# Patient Record
Sex: FEMALE | Race: WHITE | NOT HISPANIC OR LATINO | URBAN - METROPOLITAN AREA
[De-identification: names, ages, dates, MRNs, and addresses within clinical notes are randomized per-mention and may not be internally consistent; named-entity substitution may affect disease eponyms.]

---
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
## 2021-05-11 NOTE — H&P
History and Physical      Patient Name: Jayla Lu   Patient ID: [id number]   Sex: Female   YOB: 2000    Primary Care Provider: Rosalinda KAY   Referring Provider: Rosalinda KAY    Visit Date: March 19, 2019    Provider: ELIZA Porter   Location: [facility]   Location Address: [address] [place] [address], [address]  [place], [place]  [id number]   Location Phone: [phone]          Chief Complaint  · New Patient/Establish Care      History Of Present Illness  Jayla Lu is a 19 year old /White female who presents for evaluation and treatment of:      New Patient/Establish Care  Previous PCP was Dr. Javier Pediatrician with [facility].    Pt c/o having possible hemorrhoid.  She reports it has been there for about 1 year. Notices blood in stool a times, and feeling like it is poking out after a BM, it is not today.  She reports it does bleed at times it only when she wipes.  Last episode of bleeding was about 4 months ago, has only happed a few times.  Has regular bowel movments.    Has been a [employer] for 4 yrs now.  She does eat alot of eggs, and veggies and protien bars and almond milk.    flu shot- 10/2018  Tdap- UTD  Depression screening completed with a score of 5.    She is a senior at [employer] and she plans on going to  to major and Dance and Art.       Past Surgical History  Procedure Name Date Notes   Tonsillectomy and adenoidectomy in patient less than 12 years of age 2012 --    [place] Tooth Extraction 2017 --          Allergy List  Allergen Name Date Reaction Notes   cephalexin --  --  --          Family Medical History  Disease Name Relative/Age Notes   Aneurysm / --     Grandmother (maternal)/    Bladder Cancer / --     Grandfather (paternal)/    Diabetes mellitus, type II / --     Grandmother (paternal)/    Heart Attack (MI) / --     Grandfather (maternal)/    Kidney Cancer / --     Grandfather (paternal)/          Reproductive History  Menstrual   Age  "Menarche: 13   Pregnancy Summary   Total Pregnancies: 0 Full Term: 0 Premature: 0   Ab Induced: 0 Ab Spontaneous: 0 Ectopics: 0   Multiples: 0 Livin         Social History  Finding Status Start/Stop Quantity Notes   Alcohol Never --/-- --  --    Exercises regularly --  --/-- --  2-3 x per week   Single --  --/-- --  --    Tobacco Never --/-- --  --          Review of Systems  · Constitutional  o Denies  o : fever, fatigue, weight loss, weight gain  · Cardiovascular  o Denies  o : lower extremity edema, claudication, chest pressure, palpitations  · Respiratory  o Denies  o : shortness of breath, wheezing, frequent cough, hemoptysis, dyspnea on exertion  · Gastrointestinal  o Admits  o : hemmrroids  o Denies  o : nausea, vomiting, diarrhea, constipation, abdominal pain      Vitals  Date Time BP Position Site L\R Cuff Size HR RR TEMP(F) WT  HT  BMI kg/m2 BSA m2 O2 Sat        2019 03:00 /66 Sitting    78 - R   110lbs 8oz 5'  3.5\" 19.27 1.5 98 %          Physical Examination  · Constitutional  o Appearance  o : well-nourished, well developed, alert, in no acute distress  · Respiratory  o Respiratory Effort  o : breathing unlabored  o Auscultation of Lungs  o : normal breath sounds throughout  · Cardiovascular  o Heart  o :   § Auscultation of Heart  § : regular rate and rhythm, no murmurs, gallops or rubs  § Palpation of Heart  § : normal apical impulse, no cardiac thrill present  o Peripheral Vascular System  o :   § Extremities  § : no cyanosis, clubbing or edema; less than 2 second refill noted  · Gastrointestinal  o Abdominal Examination  o : abdomen nontender to palpation, tone normal without rigidity or guarding, no masses present, abdominal contour scaphoid  o Liver and spleen  o : no hepatomegaly present, liver nontender to palpation, spleen not palpable  · Genitourinary  o Anus  o : no inflammation or lesions present  o Perineum  o : perineum within normal limits  · Skin and Subcutaneous " Tissue  o General Inspection  o : no rashes or lesions present, no areas of discoloration  · Neurologic  o Mental Status Examination  o :   § Orientation  § : grossly oriented to person, place and time  o Cranial Nerves  o : cranial nerves intact and symmetric throughout  · Psychiatric  o Mood and Affect  o : mood normal, affect appropriate, denies any SI/HI              Assessment  · Screening for depression     V79.0/Z13.89  · Screening for lipid disorders     V77.91/Z13.220  · Establishing care with new doctor, encounter for     V65.8/Z76.89  · Hemorrhoid     455.6/K64.9  · Routine lab draw     V72.60/Z00.00  · Screening for thyroid disorder     V77.0/Z13.29      Plan  · Orders  o CBC with Auto Diff [facility] (19968) - - 03/19/2019  o CMP [facility] (79975) - - 03/19/2019  o Lipid Panel [facility] (93625) - - 03/19/2019  o Thyroid Profile (31267, 13029, THYII) - - 03/19/2019  o ACO-39: Current medications updated and reviewed () - - 03/19/2019  o ACO-18: Positive screen for clinical depression using a standardized tool and a follow-up plan documented () - - 03/19/2019  o ACO-14: Influenza immunization administered or previously received () - - 03/19/2019  o ACO-16: BMI below normal range and follow-up plan is documented () - - 03/19/2019  o ACO-17: Current tobacco non-user (1036F) - - 03/19/2019  · Medications  o Anusol-HC 25 mg rectal suppository   SIG: insert 1 suppository (25 mg) by rectal route 2 times per day for 2 weeks   DISP: (28) suppositories with 0 refills  Prescribed on 03/19/2019     · Instructions  o Depression Screen completed and scanned into the EMR under the designated folder within the patient's documents.  o PHQ-9 results between 5-9 indicate Minimal Depression  o Take all medications as prescribed/directed.  o Patient was educated/instructed on their diagnosis, treatment and medications prior to discharge from the clinic today.  o Patient was instructed to exercise regularly.  o Patient  instructed to seek medical attention urgently for new or worsening symptoms.  o Call the office with any concerns or questions.  o advised f/u if symtoms do not resolve and will consult GI  · Disposition  o Call or Return if symptoms worsen or persist.  o Return Visit Request in/on 1 year +/- 2 weeks ([id number]).            Electronically Signed by: ELIZA Porter -Author on March 19, 2019 03:43:11 PM

## 2021-05-14 NOTE — PROGRESS NOTES
Progress Note      Patient Name: Jayla Lu   Patient ID: [id number]   Sex: Female   YOB: 2000    Primary Care Provider: Rosalinda KAY   Referring Provider: Rosalinda KAY    Visit Date: August 15, 2019    Provider: ELIZA Porter   Location: [facility]   Location Address: [address] [place] [address], [address]  [place]  [id number]   Location Phone: [phone]          Chief Complaint  · discuss birth control  · immunizations      History Of Present Illness  Jayla Lu is a 19 year old /White female who presents for evaluation and treatment of:      pt is here today to discuss Birth Control and Immunizations.    Pt would like to discuss starting birth control due to mood swings. She states that she will have high high's and low low's. She is starting collage and feels that starting birth control would help this. She is on her cycle now and  is sexually active.    LMP: started 20.    She is also wondering about immunizations for school. She brought a copy of her shot records with her today.            Past Medical History  Disease Name Date Onset Notes   Hemorrhoid --  --          Past Surgical History  Procedure Name Date Notes   Tonsillectomy and adenoidectomy in patient less than 12 years of age  --    [place] Tooth Extraction  --          Allergy List  Allergen Name Date Reaction Notes   cephalexin --  --  --          Family Medical History  Disease Name Relative/Age Notes   Aneurysm Grandmother (maternal)/   --    Heart Attack (MI) Grandfather (maternal)/   --    Diabetes mellitus, type II Grandmother (paternal)/   --    Bladder Cancer Grandfather (paternal)/   --    Kidney Cancer Grandfather (paternal)/   --          Reproductive History  Menstrual   Age Menarche: 13   Pregnancy Summary   Total Pregnancies: 0 Full Term: 0 Premature: 0   Ab Induced: 0 Ab Spontaneous: 0 Ectopics: 0   Multiples: 0 Livin         Social History  Finding Status  "Start/Stop Quantity Notes   Alcohol Never --/-- --  --    Exercises regularly --  --/-- --  2-3 x per week   Single --  --/-- --  --    Student --  --/-- --  --    Tobacco Never --/-- --  --          Immunizations  NameDate Admin Mfg Trade Name Lot Number Route Inj VIS Given VIS Publication   Idbsjxazj20/01/2018 NE Not Entered  NE NE     Comments: Pt reported         Review of Systems  · Constitutional  o Denies  o : fever, fatigue, weight loss, weight gain  · Cardiovascular  o Denies  o : lower extremity edema, claudication, chest pressure, palpitations  · Respiratory  o Denies  o : shortness of breath, wheezing, cough, hemoptysis, dyspnea on exertion  · Gastrointestinal  o Denies  o : nausea, vomiting, diarrhea, constipation, abdominal pain      Vitals  Date Time BP Position Site L\R Cuff Size HR RR TEMP (F) WT  HT  BMI kg/m2 BSA m2 O2 Sat HC       03/19/2019 03:00 /66 Sitting    78 - R   110lbs 8oz 5'  3.5\" 19.27 1.5 98 %    08/15/2019 11:26 /74 Sitting    81 - R   121lbs 1oz 5'  3\" 21.45 1.56 96 %          Physical Examination  · Constitutional  o Appearance  o : well-nourished, well developed, alert, in no acute distress  · Respiratory  o Respiratory Effort  o : breathing unlabored  o Auscultation of Lungs  o : normal breath sounds throughout  · Cardiovascular  o Heart  o :   § Auscultation of Heart  § : regular rate and rhythm, no murmurs, gallops or rubs  § Palpation of Heart  § : normal apical impulse, no cardiac thrill present  o Peripheral Vascular System  o :   § Extremities  § : no cyanosis, clubbing or edema; less than 2 second refill noted  · Gastrointestinal  o Abdominal Examination  o : abdomen nontender to palpation, tone normal without rigidity or guarding, no masses present, abdominal contour scaphoid  o Liver and spleen  o : no hepatomegaly present, liver nontender to palpation, spleen not palpable  · Skin and Subcutaneous Tissue  o General Inspection  o : no rashes or lesions " present, no areas of discoloration  · Neurologic  o Mental Status Examination  o :   § Orientation  § : grossly oriented to person, place and time  o Cranial Nerves  o : cranial nerves intact and symmetric throughout  · Psychiatric  o Mood and Affect  o : mood normal, affect appropriate          Results  · In-Office Procedures  o Lab procedure  § IOP - Urine Pregnancy Test (55022)   § B-HCG Ur Ql: Negative   § Internal Control Verified?: Yes       Assessment  · Mood swings     296.99/R45.86      Plan  · Orders  o ACO-39: Current medications updated and reviewed () - - 08/15/2019  o ACO-14: Influenza immunization was not administered for reasons documented () - - 08/15/2019   pt has not received the flu vaccine for the 2699-0249 flu season  · Medications  o Loestrin Fe 1.5/30 (28-Day) 1.5 mg-30 mcg (21)/75 mg (7) oral tablet   SIG: take 1 tablet by oral route once daily for 90 days   DISP: (90) tablets with 2 refills  Prescribed on 08/15/2019     · Instructions  o Patient was educated/instructed on their diagnosis, treatment and medications prior to discharge from the clinic today.  o Patient instructed to seek medical attention urgently for new or worsening symptoms.  o Call the office with any concerns or questions.  · Disposition  o Call or Return if symptoms worsen or persist.  o Return Visit Request in/on 1 year +/- 2 weeks ([id number]).            Electronically Signed by: ELIZA Porter -Author on August 15, 2019 12:04:46 PM

## 2021-05-14 NOTE — PROGRESS NOTES
Progress Note      Patient Name: Jayla Lu   Patient ID: [id number]   Sex: Female   YOB: 2000    Primary Care Provider: Rosalinda KAY   Referring Provider: Rosalinda KAY    Visit Date: August 17, 2020    Provider: ELIZA Porter   Location: [facility]   Location Address: [address] [place] [address], [address]  [place], [place]  [id number]   Location Phone: [phone]          Chief Complaint  · F/U Birth Control       History Of Present Illness  Video Conferencing Visit  Jayla Lu is a 20 year old /White female who is presenting for evaluation via video conferencing via [employer]. Verbal consent obtained before beginning visit.   The following staff were present during this visit: ELIZA Porter and ZACHERY Riddle   Jayla Lu is a 20 year old /White female who presents for evaluation and treatment of:      Pt is a f/u for birth control. Pt would like to discuss different options of Birth Control. She is interested in an IUD.  She would like a referral to OBGYN in [place].    Pt would like a referral for psychology in [place] for an evaluation of possible ADD.       Past Medical History  Disease Name Date Onset Notes   Hemorrhoid --  --          Past Surgical History  Procedure Name Date Notes   Tonsillectomy and adenoidectomy in patient less than 12 years of age 2012 --    [place] Tooth Extraction 2017 --          Medication List  Name Date Started Instructions   Loestrin Fe 1.5/30 (28-Day) 1.5 mg-30 mcg (21)/75 mg (7) oral tablet 08/15/2019 take 1 tablet by oral route once daily for 90 days         Allergy List  Allergen Name Date Reaction Notes   cephalexin --  --  --          Family Medical History  Disease Name Relative/Age Notes   Aneurysm Grandmother (maternal)/   --    Heart Attack (MI) Grandfather (maternal)/   --    Diabetes Mellitus, Type II Grandmother (paternal)/   --    Bladder Cancer Grandfather (paternal)/   --    Kidney  Cancer Grandfather (paternal)/   --          Reproductive History  Menstrual   Age Menarche: 13   Pregnancy Summary   Total Pregnancies: 0 Full Term: 0 Premature: 0   Ab Induced: 0 Ab Spontaneous: 0 Ectopics: 0   Multiples: 0 Livin         Social History  Finding Status Start/Stop Quantity Notes   Alcohol Never --/-- --  --    Exercises regularly --  --/-- --  2-3 x per week   Single --  --/-- --  --    Student --  --/-- --  --    Tobacco Never --/-- --  --          Immunizations  NameDate Admin Mfg Trade Name Lot Number Route Inj VIS Given VIS Publication   DTaP2005 NE Not Entered  NE NE     Comments: PT REPORTED [facility]   DTaP2002 NE Not Entered  NE NE     Comments: PT REPORTED [facility] PEDIATR[facility]   DTaP2001 NE Not Entered  NE NE     Comments: PT REPORTED [facility]   DTaP2001 NE Not Entered  NE NE     Comments: PT REPORTED [facility]   DTaP2001 NE Not Entered  NE NE     Comments: PT REPORTED [facility] PED[facility]   Hepatitis A02017 NE Not Entered  NE NE     Comments: PT REPORTED [facility] P[facility]   Hepatitis A02003 NE Not Entered  NE NE     Comments: PT REPORTED [facility]   Hepatitis B02002 NE Not Entered  NE NE     Comments: PT REPORTED [facility]   Hepatitis B02001 NE Not Entered  NE NE     Comments: PT REPORTED [facility]   Hepatitis B02001 NE Not Entered  NE NE     Comments: PT REPORTED [facility]   Hib2002 NE Not Entered  NE NE     Comments: PT REPORTED [facility]   Hib2001 NE Not Entered  NE NE     Comments: PT REPORTED [facility]   Hib2001 NE Not Entered  NE NE     Comments: PT REPORTED [facility]    Festuffku71/01/2018 NE Not Entered  NE NE     Comments: Pt reported   IPV02/11/2005 NE Not Entered  NE NE     Comments: PT REPORTED [facility] PED[facility]ATR[facility]   IPV08/12/2002 NE Not Entered  NE NE     Comments: PT REPORTED [facility] PED[facility]   IPV2001 NE Not Entered  NE NE     Comments: PT REPORTED [facility] PED[facility]   IPV2001 NE Not Entered  NE NE     Comments: PT REPORTED [facility] PEDIATR[facility]   Meningococcal (MNG)06/30/2017 NE Not Entered  NE NE     Comments: PT REPORTED [facility] PEDIATR[facility]   Meningococcal (MNG)04/11/2012 NE Not Entered  NE NE     Comments: PT REPORTED [facility] PEDIATR[facility]   MMR02/11/2005 NE Not Entered  NE NE     Comments: PT REPORTED [facility] PEDIATRICS   MMR02/05/2002 MSD M-M-R II  NE NE     Comments: PT REPORTED. [facility] PEDIATRICS   Prevnar 132001 NE Not Entered  NE NE     Comments: PT REPORTED [facility] PEDIATRICS   Prevnar 132001 NE Not Entered  NE NE     Comments: PT REPORTED [facility] PEDIATR[facility]   Prevnar 132001 NE Not Entered  NE NE     Comments: PT REPORTED [facility] PEDIATRICS   Tdap04/11/2012 NE Not Entered  NE NE     Comments: PT REPORTED [facility] PEDIATRICS   Cflhanmmv31/30/2007 NE Not Entered  NE NE     Comments: PT REPORTED [facility] PEDIATRICS   Ekdqnkfmt91/05/2002 NE Not Entered  NE NE     Comments: PT REPORTED [facility] PEDIATRICS         Review of Systems  · Constitutional  o Denies  o : fever, fatigue, weight loss, weight gain  · Cardiovascular  o Denies  o : lower extremity edema, claudication, chest pressure, palpitations  · Respiratory  o Denies  o : shortness of breath, wheezing, cough, hemoptysis, dyspnea on exertion  · Gastrointestinal  o Denies  o : nausea, vomiting, diarrhea, constipation, abdominal  pain      Physical Examination  · Constitutional  o Appearance  o : well-nourished, well developed, alert, in no acute distress  · Skin and Subcutaneous Tissue  o General Inspection  o : no rashes or lesions present, no areas of discoloration  · Neurologic  o Mental Status Examination  o :   § Orientation  § : grossly oriented to person, place and time  · Psychiatric  o Mood and Affect  o : mood normal, affect appropriate, denies any SI/HI          Assessment  · Birth control counseling     V25.09/Z30.09  · Attention and concentration deficit     799.51/R41.840      Plan  · Orders  o ACO-39: Current medications updated and reviewed () - - 08/17/2020  o PSYCHOLOGY CONSULTATION (PSYCO) - - 08/17/2020  o OB/GYN CONSULTATION (OBGYN) - - 08/17/2020  · Medications  o Loestrin Fe 1.5/30 (28-Day) 1.5 mg-30 mcg (21)/75 mg (7) oral tablet   SIG: take 1 tablet by oral route once daily for 90 days   DISP: (90) tablets with 0 refills  Refilled on 08/17/2020     o Medications have been Reconciled  o Transition of Care or Provider Policy  · Instructions  o Patient was educated/instructed on their diagnosis, treatment and medications prior to discharge from the clinic today.  o Patient instructed to seek medical attention urgently for new or worsening symptoms.  o Call the office with any concerns or questions.  o will refill BCP until pt able to get into OBGYN for IUD placement, pt will call with name of OBGYN she wants to be referred to in [place]  o Pt will call with name of PSY she wants to be referred to in [place] for ADD eval  · Disposition  o Call or Return if symptoms worsen or persist.  o Return Visit Request in/on 1 year +/- 2 weeks ([id number]).            Electronically Signed by: ELIZA Porter -Author on August 17, 2020 11:56:15 AM

## 2021-05-15 VITALS
BODY MASS INDEX: 19.58 KG/M2 | OXYGEN SATURATION: 98 % | HEART RATE: 78 BPM | HEIGHT: 63 IN | DIASTOLIC BLOOD PRESSURE: 66 MMHG | WEIGHT: 110.5 LBS | SYSTOLIC BLOOD PRESSURE: 106 MMHG

## 2021-05-15 VITALS
HEIGHT: 63 IN | WEIGHT: 121.06 LBS | BODY MASS INDEX: 21.45 KG/M2 | SYSTOLIC BLOOD PRESSURE: 103 MMHG | OXYGEN SATURATION: 96 % | DIASTOLIC BLOOD PRESSURE: 74 MMHG | HEART RATE: 81 BPM

## 2021-06-03 ENCOUNTER — TELEPHONE (OUTPATIENT)
Dept: URGENT CARE | Facility: CLINIC | Age: 21
End: 2021-06-03

## 2022-03-01 PROCEDURE — 87661 TRICHOMONAS VAGINALIS AMPLIF: CPT | Performed by: FAMILY MEDICINE

## 2022-03-01 PROCEDURE — 87591 N.GONORRHOEAE DNA AMP PROB: CPT | Performed by: FAMILY MEDICINE

## 2022-03-01 PROCEDURE — 87491 CHLMYD TRACH DNA AMP PROBE: CPT | Performed by: FAMILY MEDICINE

## 2024-06-05 ENCOUNTER — OFFICE VISIT (OUTPATIENT)
Dept: FAMILY MEDICINE CLINIC | Facility: CLINIC | Age: 24
End: 2024-06-05
Payer: OTHER GOVERNMENT

## 2024-06-05 ENCOUNTER — LAB (OUTPATIENT)
Dept: LAB | Facility: HOSPITAL | Age: 24
End: 2024-06-05
Payer: OTHER GOVERNMENT

## 2024-06-05 ENCOUNTER — TELEPHONE (OUTPATIENT)
Dept: FAMILY MEDICINE CLINIC | Facility: CLINIC | Age: 24
End: 2024-06-05

## 2024-06-05 VITALS
SYSTOLIC BLOOD PRESSURE: 102 MMHG | DIASTOLIC BLOOD PRESSURE: 68 MMHG | HEIGHT: 64 IN | WEIGHT: 120.8 LBS | HEART RATE: 62 BPM | OXYGEN SATURATION: 99 % | BODY MASS INDEX: 20.62 KG/M2

## 2024-06-05 DIAGNOSIS — Z91.018 ALLERGY TO SESAME SEED: ICD-10-CM

## 2024-06-05 DIAGNOSIS — R17 ELEVATED BILIRUBIN: ICD-10-CM

## 2024-06-05 DIAGNOSIS — R19.5 MUCUS IN STOOL: ICD-10-CM

## 2024-06-05 DIAGNOSIS — Z20.2 STD EXPOSURE: ICD-10-CM

## 2024-06-05 DIAGNOSIS — L98.9 SKIN LESION OF BACK: ICD-10-CM

## 2024-06-05 DIAGNOSIS — Z91.018 MULTIPLE FOOD ALLERGIES: Primary | ICD-10-CM

## 2024-06-05 PROBLEM — K64.9 HEMORRHOID: Status: ACTIVE | Noted: 2024-06-05

## 2024-06-05 PROCEDURE — G0432 EIA HIV-1/HIV-2 SCREEN: HCPCS

## 2024-06-05 PROCEDURE — 87801 DETECT AGNT MULT DNA AMPLI: CPT

## 2024-06-05 PROCEDURE — 87798 DETECT AGENT NOS DNA AMP: CPT

## 2024-06-05 PROCEDURE — 87522 HEPATITIS C REVRS TRNSCRPJ: CPT

## 2024-06-05 PROCEDURE — 85027 COMPLETE CBC AUTOMATED: CPT

## 2024-06-05 PROCEDURE — 86592 SYPHILIS TEST NON-TREP QUAL: CPT

## 2024-06-05 PROCEDURE — 99214 OFFICE O/P EST MOD 30 MIN: CPT

## 2024-06-05 PROCEDURE — 87661 TRICHOMONAS VAGINALIS AMPLIF: CPT

## 2024-06-05 PROCEDURE — 80053 COMPREHEN METABOLIC PANEL: CPT

## 2024-06-05 PROCEDURE — 36415 COLL VENOUS BLD VENIPUNCTURE: CPT

## 2024-06-05 PROCEDURE — 87491 CHLMYD TRACH DNA AMP PROBE: CPT

## 2024-06-05 PROCEDURE — 87591 N.GONORRHOEAE DNA AMP PROB: CPT

## 2024-06-05 RX ORDER — EPINEPHRINE 0.3 MG/.3ML
0.3 INJECTION SUBCUTANEOUS ONCE AS NEEDED
Qty: 1 EACH | Refills: 0 | Status: SHIPPED | OUTPATIENT
Start: 2024-06-05

## 2024-06-05 NOTE — TELEPHONE ENCOUNTER
Name: Jayla Lu      Relationship: Self      Best Callback Number: [phone]      HUB PROVIDED THE RELAY MESSAGE FROM THE OFFICE    RELAY:  [facility] FOR PATIENT TO SCHEDULE A   Return in about 3 months (around 9/5/2024) for Annual w/ pap .             PATIENT: SCHEDULED PER NOTE    ADDITIONAL INFORMATION:  SCHEDULE WOULD NOT LET ME USE PHYSICAL WITH [facility]. PATIENT IS SCHEDULED FOR PHYSICAL AND APPOINTMENT NOTES STATE PHYSICAL AND PAP

## 2024-06-05 NOTE — PROGRESS NOTES
New Patient Office Visit      Date: 2024   Patient Name: Jayla Lu  : 2000   MRN: [id number]     Chief Complaint:    Chief Complaint   Patient presents with   • Allergies   • bowel problem      Mucus  in stool    • Hemorrhoids       History of Present Illness: Jayla Lu is a 24 y.o. female who is here today to establish care.  Originally born in [place], but grew up in [place]. Currently works at [employer] for 2 years.     Acute:   Recently noticed allergy to sesame containing foods. Has cooked with it in the past, but never had an issue with this before. She states that she had a sesame cookie and had her throat felt like is was closing up.  She states this lasted a few minutes.  It happened a second time when she ordered something with sesame oil,  but she thought she had asked for it to be removed.  She states it then happened again when she ate kimchi.  She states that she has never had food allergies before and these episodes have scared her.  She does not have an EpiPen.    Patient would like to see a dermatologist.  She states she has a mole on her back that used to be 1 big mole, but she has scratched it and it has divided.  She states that it gets irritated sometimes and will bleed.  Denies pain currently.    Bowel issues:   -Has recently been constipated.  Started taking a prebiotic and probiotic.  States this has helped with her bowel movements.  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  -She does have a history of external hemorrhoids.  She first noticed them when she was 12 years old and did have some bleeding and states she was evaluated for them.  She states since starting the prebiotic's and probiotics and becoming a vegan she has had less issues with the hemorrhoids and constipation.  She states she does still notice mucus in her stool.  She states when she has a bowel movement the mucus can be  "yellow.  She states she sees mucus almost every time.  She states she also passed gas a couple of weeks ago and \"a lot of mucus came out.\"  She states that this has happened on 3 different occasions.  She denies any abdominal pain, nausea, vomiting.  Denies night sweats or unintentional weight loss.  Denies any known family history of colon cancer, Crohn's disease or ulcerative colitis.  She states from time to time she still sees blood, but usually when she is constipated and having to strain.    -Possible STD exposure: Patient works at a [employer].  She states she has been abstinent for a while now, but recently had intercourse with someone she used to have frequent intercourse with.  She states she does have intercourse with men and women.  She would like to be tested today for STDs.  Denies vaginal discharge, new lesions or pelvic pain.  Denies dysuria.  Denies fevers or chills.    Medical History:  -History of genital herpes. Used to take antivirals, but does not need to take anymore unless she has a break out.   -Hx of tonsils and adenoids removal when she was younger for sleep apnea.     Family Hx:  Paternal side: diabetes runs on this side.  No known heart disease or cancer.  No known breast or ovarian cancer.        Subjective      Review of Systems:   Review of Systems   Constitutional:  Negative for chills and fever.   Eyes:  Negative for blurred vision.   Respiratory:  Negative for cough, chest tightness and shortness of breath.    Cardiovascular:  Negative for chest pain.   Gastrointestinal:  Positive for blood in stool and constipation. Negative for abdominal pain, nausea and vomiting.   Allergic/Immunologic: Positive for food allergies.   Neurological:  Negative for dizziness, light-headedness and headache.       Past Medical History:   Past Medical History:   Diagnosis Date   • Allergic     Allergic to Cefalexin and possible small food allergies       Past Surgical History:   Past Surgical History: " "  Procedure Laterality Date   • ADENOIDECTOMY     • TONSILLECTOMY         Family History: History reviewed. No pertinent family history.    Social History:   Social History     Socioeconomic History   • Marital status: Single   Tobacco Use   • Smoking status: Every Day   • Smokeless tobacco: Never   Vaping Use   • Vaping status: Never Used   Substance and Sexual Activity   • Alcohol use: Not Currently     Comment: I drink once every 4 months   • Drug use: Never   • Sexual activity: Yes     Partners: Female, Male     Birth control/protection: Condom, Abstinence, Same-sex partner       Medications:     Current Outpatient Medications:   •  EPINEPHrine (EPIPEN) 0.3 MG/0.3ML solution auto-injector injection, Inject 0.3 mL under the skin into the appropriate area as directed 1 (One) Time As Needed (Anaphylaxis) for up to 1 dose., Disp: 1 each, Rfl: 0    Allergies:   Allergies   Allergen Reactions   • Cephalosporins Other (See Comments)     As child.   • Cephalexin Rash     cephelaxen - unspecified       Objective     Physical Exam:  Vital Signs:   Vitals:    06/05/24 1442   BP: 102/68   Pulse: 62   SpO2: 99%   Weight: 54.8 kg (120 lb 12.8 oz)   Height: 162.6 cm (64\")     Body mass index is 20.74 kg/m².  BMI is within normal parameters. No other follow-up for BMI required.       Physical Exam  Vitals reviewed.   Constitutional:       Appearance: Normal appearance.   HENT:      Head: Normocephalic and atraumatic.      Nose: Nose normal.      Mouth/Throat:      Mouth: Mucous membranes are moist.      Pharynx: No posterior oropharyngeal erythema.   Eyes:      Pupils: Pupils are equal, round, and reactive to light.   Cardiovascular:      Rate and Rhythm: Normal rate and regular rhythm.      Pulses: Normal pulses.      Heart sounds: Normal heart sounds.   Pulmonary:      Effort: Pulmonary effort is normal.      Breath sounds: Normal breath sounds.   Abdominal:      General: Abdomen is flat. Bowel sounds are normal. There is no " distension.      Tenderness: There is no abdominal tenderness. There is no guarding.   Lymphadenopathy:      Cervical: No cervical adenopathy.   Skin:     General: Skin is warm.      Comments: Skin lesion in center of back.   Neurological:      General: No focal deficit present.      Mental Status: She is alert and oriented to person, place, and time.   Psychiatric:         Mood and Affect: Mood normal.                     Assessment / Plan      Assessment/Plan:   Diagnoses and all orders for this visit:    1. Multiple food allergies (Primary)  -     EPINEPHrine (EPIPEN) 0.3 MG/0.3ML solution auto-injector injection; Inject 0.3 mL under the skin into the appropriate area as directed 1 (One) Time As Needed (Anaphylaxis) for up to 1 dose.  Dispense: 1 each; Refill: 0  -     Ambulatory Referral to Allergy    2. 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  Dispense: 1 each; Refill: 0  -     Ambulatory Referral to Allergy    3. Skin lesion of back  -     Ambulatory Referral to Dermatology    4. Mucus in stool  -     Ambulatory Referral to Gastroenterology  -     CBC (No Diff); Future  -     Comprehensive Metabolic Panel; Future    5. STD exposure  -     NuSwab VG+ - Swab, Vagina; Future  -     CBC (No Diff); Future  -     Comprehensive Metabolic Panel; Future  -     RPR; Future  -     HIV-1 / O / 2 Ag / Antibody; Future  -     Hepatitis C RNA, quantitative, PCR (graph); Future  -     NuSwab VG+ - Swab, Vagina       Discussed avoiding all sesame containing foods as this seems like it may be the source of her problem.  I do think seeing an allergist would be beneficial for her.  I am going to send an EpiPen for her to have in case this happens again.  Explained that if she is ever having difficulty breathing or feels like her throat is closing up  that it is important to go and be evaluated by a healthcare provider in the ER.  Also recommend having Benadryl on hand in case she gets exposed as well.  Reviewed how to use an EpiPen.    Will send dermatology referral off for her lesion to be evaluated.  May need possible biopsy as it is irregular in shape and I can see where it is irritated.    Recommend full workup by GI with her history of mucous in her stool and constipation and hemorrhoids.  I do think mucus may likely be related to the constipation, but believe a workup is warranted.  Will do blood work today to evaluate for any anemia.  Recommended taking MiraLAX to help soften bowel movements and make them easier to go.  Can also try Colace.  Recommend staying well-hydrated with water.  Return to clinic if worsening or changing of your symptoms.    Will do STI check with patient's background.  Encouraged frequent checking.  Recommend condom/protection with female and male partners.  Will treat or refer for any positive test results that come back.  If you begin having symptoms please return to clinic for reevaluation.    Follow Up:   Return in about 3 months (around 9/5/2024) for Annual w/ pap .    Arely [facility] PA-C   [facility]

## 2024-06-05 NOTE — TELEPHONE ENCOUNTER
[place]:  [facility] [place]   Return in about 3 months (around 9/5/2024) for Annual w/ pap .

## 2024-06-06 ENCOUNTER — TELEPHONE (OUTPATIENT)
Dept: FAMILY MEDICINE CLINIC | Facility: CLINIC | Age: 24
End: 2024-06-06
Payer: OTHER GOVERNMENT

## 2024-06-06 LAB
ALBUMIN SERPL-MCNC: 4.8 G/DL (ref 3.5–5.2)
ALBUMIN/GLOB SERPL: 1.8 G/DL
ALP SERPL-CCNC: 84 U/L (ref 39–117)
ALT SERPL W P-5'-P-CCNC: 18 U/L (ref 1–33)
ANION GAP SERPL CALCULATED.3IONS-SCNC: 10 MMOL/L (ref 5–15)
AST SERPL-CCNC: 22 U/L (ref 1–32)
BILIRUB SERPL-MCNC: 1.4 MG/DL (ref 0–1.2)
BUN SERPL-MCNC: 16 MG/DL (ref 6–20)
BUN/CREAT SERPL: 18.4 (ref 7–25)
CALCIUM SPEC-SCNC: 9.3 MG/DL (ref 8.6–10.5)
CHLORIDE SERPL-SCNC: 106 MMOL/L (ref 98–107)
CO2 SERPL-SCNC: 24 MMOL/L (ref 22–29)
CREAT SERPL-MCNC: 0.87 MG/DL (ref 0.57–1)
DEPRECATED RDW RBC AUTO: 39.3 FL (ref 37–54)
EGFRCR SERPLBLD CKD-EPI 2021: 96.1 ML/MIN/1.73
ERYTHROCYTE [DISTWIDTH] IN BLOOD BY AUTOMATED COUNT: 12.4 % (ref 12.3–15.4)
GLOBULIN UR ELPH-MCNC: 2.7 GM/DL
GLUCOSE SERPL-MCNC: 66 MG/DL (ref 65–99)
HCT VFR BLD AUTO: 39.7 % (ref 34–46.6)
HGB BLD-MCNC: 13.1 G/DL (ref 12–15.9)
HIV 1+2 AB+HIV1 P24 AG SERPL QL IA: NORMAL
MCH RBC QN AUTO: 28.8 PG (ref 26.6–33)
MCHC RBC AUTO-ENTMCNC: 33 G/DL (ref 31.5–35.7)
MCV RBC AUTO: 87.3 FL (ref 79–97)
PLATELET # BLD AUTO: 182 10*3/MM3 (ref 140–450)
PMV BLD AUTO: 11.4 FL (ref 6–12)
POTASSIUM SERPL-SCNC: 3.9 MMOL/L (ref 3.5–5.2)
PROT SERPL-MCNC: 7.5 G/DL (ref 6–8.5)
RBC # BLD AUTO: 4.55 10*6/MM3 (ref 3.77–5.28)
RPR SER QL: NORMAL
SODIUM SERPL-SCNC: 140 MMOL/L (ref 136–145)
WBC NRBC COR # BLD AUTO: 3.56 10*3/MM3 (ref 3.4–10.8)

## 2024-06-06 NOTE — TELEPHONE ENCOUNTER
----- Message from Arely [facility] sent at 6/6/2024  8:48 AM EDT -----  Your blood work mostly within normal limits.  Your bilirubin slightly elevated, but your liver enzymes are normal.     Will recheck in 1 week. Let me know if you are having nausea, abdominal pain or vomiting.     Unable to reach patient.    Please relay message.

## 2024-06-07 NOTE — TELEPHONE ENCOUNTER
Patient informed of results and recommendations. She is not having the symptoms below and will come back in and recheck the labs.

## 2024-06-08 LAB
A VAGINAE DNA VAG QL NAA+PROBE: NORMAL SCORE
BVAB2 DNA VAG QL NAA+PROBE: NORMAL SCORE
C ALBICANS DNA VAG QL NAA+PROBE: NEGATIVE
C GLABRATA DNA VAG QL NAA+PROBE: NEGATIVE
C TRACH DNA VAG QL NAA+PROBE: NEGATIVE
MEGA1 DNA VAG QL NAA+PROBE: NORMAL SCORE
N GONORRHOEA DNA VAG QL NAA+PROBE: NEGATIVE
T VAGINALIS DNA VAG QL NAA+PROBE: NEGATIVE

## 2024-06-10 LAB
HCV RNA SERPL NAA+PROBE-ACNC: NORMAL IU/ML
TEST INFORMATION: NORMAL

## 2024-06-17 ENCOUNTER — TELEPHONE (OUTPATIENT)
Dept: FAMILY MEDICINE CLINIC | Facility: CLINIC | Age: 24
End: 2024-06-17

## 2024-06-17 NOTE — TELEPHONE ENCOUNTER
Caller: Jayla Lu    Relationship: Self    Best call back number: [phone]     What is the best time to reach you: AFTER 2 PM    Who are you requesting to speak with (clinical staff, provider,  specific staff member): ELIAZAR OTERO    Do you know the name of the person who called: PT    What was the call regarding: PT NEEDS PAPERWORK FOR [facility] FILLED OUT AND SENT BACK FOR PCP..    Is it okay if the provider responds through MyChart: YES

## 2024-06-24 ENCOUNTER — OFFICE VISIT (OUTPATIENT)
Dept: FAMILY MEDICINE CLINIC | Facility: CLINIC | Age: 24
End: 2024-06-24
Payer: OTHER GOVERNMENT

## 2024-06-24 ENCOUNTER — LAB (OUTPATIENT)
Dept: LAB | Facility: HOSPITAL | Age: 24
End: 2024-06-24
Payer: OTHER GOVERNMENT

## 2024-06-24 VITALS
OXYGEN SATURATION: 97 % | HEART RATE: 72 BPM | SYSTOLIC BLOOD PRESSURE: 104 MMHG | DIASTOLIC BLOOD PRESSURE: 68 MMHG | HEIGHT: 64 IN | BODY MASS INDEX: 20.69 KG/M2 | WEIGHT: 121.2 LBS

## 2024-06-24 DIAGNOSIS — F41.9 ANXIETY: ICD-10-CM

## 2024-06-24 DIAGNOSIS — K21.9 GASTROESOPHAGEAL REFLUX DISEASE, UNSPECIFIED WHETHER ESOPHAGITIS PRESENT: ICD-10-CM

## 2024-06-24 DIAGNOSIS — A60.09 HERPES SIMPLEX OF FEMALE GENITALIA: ICD-10-CM

## 2024-06-24 DIAGNOSIS — R17 ELEVATED BILIRUBIN: ICD-10-CM

## 2024-06-24 DIAGNOSIS — N92.6 IRREGULAR MENSTRUAL CYCLE: ICD-10-CM

## 2024-06-24 DIAGNOSIS — F43.10 PTSD (POST-TRAUMATIC STRESS DISORDER): ICD-10-CM

## 2024-06-24 DIAGNOSIS — L98.9 SKIN LESION: Primary | ICD-10-CM

## 2024-06-24 LAB
ALBUMIN SERPL-MCNC: 4.4 G/DL (ref 3.5–5.2)
ALBUMIN/GLOB SERPL: 1.5 G/DL
ALP SERPL-CCNC: 88 U/L (ref 39–117)
ALT SERPL W P-5'-P-CCNC: 15 U/L (ref 1–33)
ANION GAP SERPL CALCULATED.3IONS-SCNC: 11 MMOL/L (ref 5–15)
AST SERPL-CCNC: 20 U/L (ref 1–32)
B-HCG UR QL: NEGATIVE
BILIRUB SERPL-MCNC: 1.9 MG/DL (ref 0–1.2)
BUN SERPL-MCNC: 10 MG/DL (ref 6–20)
BUN/CREAT SERPL: 12.3 (ref 7–25)
CALCIUM SPEC-SCNC: 9.1 MG/DL (ref 8.6–10.5)
CHLORIDE SERPL-SCNC: 100 MMOL/L (ref 98–107)
CO2 SERPL-SCNC: 27 MMOL/L (ref 22–29)
CREAT SERPL-MCNC: 0.81 MG/DL (ref 0.57–1)
EGFRCR SERPLBLD CKD-EPI 2021: 104.8 ML/MIN/1.73
EXPIRATION DATE: NORMAL
GLOBULIN UR ELPH-MCNC: 3 GM/DL
GLUCOSE SERPL-MCNC: 84 MG/DL (ref 65–99)
INTERNAL NEGATIVE CONTROL: NORMAL
INTERNAL POSITIVE CONTROL: NORMAL
Lab: NORMAL
POTASSIUM SERPL-SCNC: 4 MMOL/L (ref 3.5–5.2)
PROT SERPL-MCNC: 7.4 G/DL (ref 6–8.5)
SODIUM SERPL-SCNC: 138 MMOL/L (ref 136–145)

## 2024-06-24 PROCEDURE — 87591 N.GONORRHOEAE DNA AMP PROB: CPT

## 2024-06-24 PROCEDURE — 87801 DETECT AGNT MULT DNA AMPLI: CPT

## 2024-06-24 PROCEDURE — 87798 DETECT AGENT NOS DNA AMP: CPT

## 2024-06-24 PROCEDURE — 87661 TRICHOMONAS VAGINALIS AMPLIF: CPT

## 2024-06-24 PROCEDURE — 87491 CHLMYD TRACH DNA AMP PROBE: CPT

## 2024-06-24 PROCEDURE — 81025 URINE PREGNANCY TEST: CPT

## 2024-06-24 PROCEDURE — 99214 OFFICE O/P EST MOD 30 MIN: CPT

## 2024-06-24 PROCEDURE — 80053 COMPREHEN METABOLIC PANEL: CPT

## 2024-06-24 RX ORDER — PANTOPRAZOLE SODIUM 20 MG/1
20 TABLET, DELAYED RELEASE ORAL DAILY
Qty: 30 TABLET | Refills: 1 | Status: SHIPPED | OUTPATIENT
Start: 2024-06-24

## 2024-06-24 RX ORDER — VALACYCLOVIR HYDROCHLORIDE 500 MG/1
500 TABLET, FILM COATED ORAL EVERY 12 HOURS
Qty: 6 TABLET | Refills: 0 | Status: SHIPPED | OUTPATIENT
Start: 2024-06-24 | End: 2024-06-27

## 2024-06-24 NOTE — PROGRESS NOTES
Office Note     Name: Jayla Lu    : 2000     MRN: [id number]     Chief Complaint  Acne (Red bumps on her bottom , she states it wasn't itchy she went to  and they gave her an ointment and she says its getting better), Herpes Zoster (Pt states she had a really bad flare she states that they have now scabbed over but the bumps are still there ), and Heartburn (She states she gets acid reflux in the mornings and it last a couple of hours)    Subjective     History of Present Illness:  Jayla Lu is a 24 y.o. female who presents today for discussion of acne.  States that she went to urgent care several days ago after noticing a red bump on her left buttocks.  She states it was not painful.  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  She states that recently this has been an more frequent occurrence.  She states she has never had activity here before.  States is not painful and did not leak any fluid.  She went to urgent care and received a topical antibiotic ointment.  She states it has been improving since starting this.  She would like it evaluated today.  Denies fevers or chills.    Patient also has a history of herpes simplex virus of her genital region.  Patient states she has not had an outbreak in several years.  She states recently she has been under a lot of stress and had an increase in anxiety.  She states that she developed some sores and vesicles about 2 days ago.  She states it is painful, but not itchy.  Denies any drainage from the area.  She states she did recently have intercourse with a partner.  She had not had intercourse within a long time.  She denies pelvic pain, fevers or chills.  Denies new vaginal discharge or pain.  She states that she did have a shorter menstrual cycle recently.  She thinks this could be due to stress, but it was different from her normal.  She would like to have a pregnancy test done just in case.   She denies any abdominal pain, nausea, vomiting.        Patient states that she feels like her OCD and anxiety have worsened since becoming a stripper.  She states that her GI symptoms that were discussed at her last visit have also caused her some stress as she is worried something may be wrong with her.  The symptoms have not changed.  He states that she does currently see a counselor, but feels like they may not be a good match for each other.  Patient expresses that she has history of PTSD and is not sure if her counselor specializes in this.  She feels like a lot of the suggestions are not very helpful.  She has never been on anything for medication.  Denies SI or HI.  She is interested in trying a new therapist.  She has noticed an increase in acid reflux in the morning when she gets up after eating.  Denies any abdominal pain, but is belching more frequently.  Denies blood in her stool.            Answers submitted by the patient for this visit:  Primary Reason for Visit (Submitted on 6/22/2024)  What is the primary reason for your visit?: Rash  Rash Questionnaire (Submitted on 6/22/2024)  Chief Complaint: Rash  Chronicity: new  Onset: in the past 7 days  Progression since onset: worsening  Affected locations: left buttock, right buttock  Characteristics: burning, redness, draining, itchiness  Exposed to: unknown  anorexia: No  facial edema: No  joint pain: No  nail changes: No  Additional Information: I believe its related to my hormones. I had a short period and it was two days late. My bleeding was heavy the first two days and stopped when regularly I bleed for 4 days.      Review of Systems:   Review of Systems   Constitutional:  Negative for chills, fatigue and fever.   HENT:  Negative for congestion, rhinorrhea and sore throat.    Respiratory:  Negative for cough and shortness of breath.    Gastrointestinal:  Negative for abdominal pain, diarrhea and vomiting.   Genitourinary:  Positive for genital  sores and menstrual problem. Negative for pelvic pain, pelvic pressure, vaginal discharge and vaginal pain.   Skin:  Positive for rash.       Past Medical History:   Past Medical History:   Diagnosis Date    Allergic     Allergic to Cefalexin and possible small food allergies       Past Surgical History:   Past Surgical History:   Procedure Laterality Date    ADENOIDECTOMY      TONSILLECTOMY         Family History: History reviewed. No pertinent family history.    Social History:   Social History     Socioeconomic History    Marital status: Single   Tobacco Use    Smoking status: Every Day    Smokeless tobacco: Never   Vaping Use    Vaping status: Never Used   Substance and Sexual Activity    Alcohol use: Not Currently     Comment: I drink once every 4 months    Drug use: Never    Sexual activity: Yes     Partners: Female, Male     Birth control/protection: Condom, Abstinence, Same-sex partner       Immunizations:   Immunization History   Administered Date(s) Administered    DTaP 2000, 2000, 2000, 08/12/2002, 02/11/2005    DTaP / Hep B / IPV 2001, 2001, 08/12/2002, 02/11/2005    Fluzone Quad >6mos (Multi-dose) 10/01/2018    Hep A, 2 Dose 02/11/2003, 06/30/2017    Hep B, Adolescent or Pediatric 2000, 2001, 02/05/2002    Hib (HbOC) 2001, 2001, 02/05/2002    MMR 02/05/2002, 02/11/2005    Meningococcal Conjugate 04/11/2012, 06/30/2017    Pneumococcal Conjugate 13-Valent (PCV13) 2001, 2001, 2001    Tdap 04/11/2012    Varicella 02/05/2002, 01/30/2007        Medications:     Current Outpatient Medications:     EPINEPHrine (EPIPEN) 0.3 MG/0.3ML solution auto-injector injection, Inject 0.3 mL under the skin into the appropriate area as directed 1 (One) Time As Needed (Anaphylaxis) for up to 1 dose., Disp: 1 each, Rfl: 0    mupirocin (BACTROBAN) 2 % ointment, Apply 1 Application topically to the appropriate area as directed 2 (Two) Times a Day., Disp: ,  "Rfl:     pantoprazole (PROTONIX) 20 MG EC tablet, Take 1 tablet by mouth Daily., Disp: 30 tablet, Rfl: 1    valACYclovir (Valtrex) 500 MG tablet, Take 1 tablet by mouth Every 12 (Twelve) Hours for 3 days., Disp: 6 tablet, Rfl: 0    Allergies:   Allergies   Allergen Reactions    Cephalosporins Other (See Comments)     As child.    Cephalexin Rash     cephelaxen - unspecified       Objective     Vital Signs  /68   Pulse 72   Ht 162.6 cm (64.02\")   Wt 55 kg (121 lb 3.2 oz)   SpO2 97%   BMI 20.79 kg/m²   Estimated body mass index is 20.79 kg/m² as calculated from the following:    Height as of this encounter: 162.6 cm (64.02\").    Weight as of this encounter: 55 kg (121 lb 3.2 oz).    BMI is within normal parameters. No other follow-up for BMI required.       Physical Exam  Vitals reviewed.   Constitutional:       Appearance: Normal appearance.   HENT:      Head: Normocephalic and atraumatic.   Eyes:      Pupils: Pupils are equal, round, and reactive to light.   Cardiovascular:      Rate and Rhythm: Normal rate and regular rhythm.      Pulses: Normal pulses.      Heart sounds: Normal heart sounds.   Pulmonary:      Effort: Pulmonary effort is normal.      Breath sounds: Normal breath sounds.   Abdominal:      General: Abdomen is flat. Bowel sounds are normal.   Genitourinary:     Comments: One small red bump on left buttocks. No purulent drainage. No warmth to touch.   Declined vaginal/genital exam.   Skin:     General: Skin is warm.   Neurological:      General: No focal deficit present.      Mental Status: She is alert and oriented to person, place, and time.   Psychiatric:         Mood and Affect: Mood normal.            Results:  Recent Results (from the past 24 hour(s))   POC Pregnancy, Urine    Collection Time: 06/24/24  3:39 PM    Specimen: Urine   Result Value Ref Range    HCG, Urine, QL Negative Negative    Lot Number [id number]     Internal Positive Control Passed Positive, Passed    Internal Negative " Control Passed Negative, Passed    Expiration Date 02/07/2025         Assessment and Plan     Assessment/Plan:  Diagnoses and all orders for this visit:    1. Skin lesion (Primary)    2. Irregular menstrual cycle  -     POC Pregnancy, Urine  -     NuSwab VG+ - Swab, Vagina; Future  -     NuSwab VG+ - Swab, Vagina    3. PTSD (post-traumatic stress disorder)  -     Ambulatory Referral to Behavioral Health    4. Anxiety  -     Ambulatory Referral to Behavioral Health    5. Gastroesophageal reflux disease, unspecified whether esophagitis present  -     pantoprazole (PROTONIX) 20 MG EC tablet; Take 1 tablet by mouth Daily.  Dispense: 30 tablet; Refill: 1    6. Herpes simplex of female genitalia  -     valACYclovir (Valtrex) 500 MG tablet; Take 1 tablet by mouth Every 12 (Twelve) Hours for 3 days.  Dispense: 6 tablet; Refill: 0    Patient skin lesion appears to be acne.  Improving with mupirocin.  Continue treatment at this time.  There is no purulent drainage.  Discussed signs of infection with the patient.  Recommend showering after using hot tubs.  Return if symptoms worsen.  I do believe the shorter menstrual cycle, GERD and herpes outbreak are likely related to her new increase in stress from since starting her new job.  Patient's pregnancy test was negative today.  Will test for STIs.  Patient to notify me of any new dysuria, vaginal pain, vaginal bleeding or discharge.  Consider referral to OB/GYN in the future.  New referral for therapy placed for the patient.  If you have worsening of your symptoms please return to clinic sooner.  Not interested in medication management at this time.  Discussed safe sex practices with the patient.  Recommend over-the-counter Protonix for GERD.   Avoid spicy foods or acidic foods.  Do not lay down flat after having a large meal at night. If symptoms persist please return to clinic for reevaluation.     Will start valacyclovir twice daily for 3 days for HSV outbreak.  Patient to  notify me of new or worsening changes.  She verbalized understanding and agreed to this plan.      Follow Up  Return in about 2 months (around 8/24/2024).    Arely [facility] PA-C   [facility]

## 2024-06-27 LAB
A VAGINAE DNA VAG QL NAA+PROBE: NORMAL SCORE
BVAB2 DNA VAG QL NAA+PROBE: NORMAL SCORE
C ALBICANS DNA VAG QL NAA+PROBE: NEGATIVE
C GLABRATA DNA VAG QL NAA+PROBE: NEGATIVE
C TRACH DNA SPEC QL NAA+PROBE: NEGATIVE
MEGA1 DNA VAG QL NAA+PROBE: NORMAL SCORE
N GONORRHOEA DNA VAG QL NAA+PROBE: NEGATIVE
T VAGINALIS DNA VAG QL NAA+PROBE: NEGATIVE

## 2024-08-12 ENCOUNTER — HOSPITAL ENCOUNTER (OUTPATIENT)
Dept: ULTRASOUND IMAGING | Facility: HOSPITAL | Age: 24
Discharge: HOME | End: 2024-08-12
Payer: OTHER GOVERNMENT

## 2024-08-12 DIAGNOSIS — R17 ELEVATED BILIRUBIN: ICD-10-CM

## 2024-08-12 PROCEDURE — 76705 ECHO EXAM OF ABDOMEN: CPT

## 2024-08-13 DIAGNOSIS — K76.0 HEPATIC STEATOSIS: ICD-10-CM

## 2024-08-13 DIAGNOSIS — K76.0 HEPATIC STEATOSIS: Primary | ICD-10-CM

## 2024-08-13 DIAGNOSIS — R17 ELEVATED BILIRUBIN: Primary | ICD-10-CM

## 2024-10-25 ENCOUNTER — OFFICE VISIT (OUTPATIENT)
Dept: INTERNAL MEDICINE | Facility: CLINIC | Age: 24
End: 2024-10-25
Payer: OTHER GOVERNMENT

## 2024-10-25 VITALS
RESPIRATION RATE: 16 BRPM | HEIGHT: 64 IN | TEMPERATURE: 97.3 F | DIASTOLIC BLOOD PRESSURE: 70 MMHG | WEIGHT: 122.4 LBS | HEART RATE: 74 BPM | OXYGEN SATURATION: 98 % | BODY MASS INDEX: 20.9 KG/M2 | SYSTOLIC BLOOD PRESSURE: 100 MMHG

## 2024-10-25 DIAGNOSIS — G47.9 SLEEP DISTURBANCE: ICD-10-CM

## 2024-10-25 DIAGNOSIS — K21.9 GASTROESOPHAGEAL REFLUX DISEASE, UNSPECIFIED WHETHER ESOPHAGITIS PRESENT: ICD-10-CM

## 2024-10-25 DIAGNOSIS — R17 ELEVATED BILIRUBIN: ICD-10-CM

## 2024-10-25 DIAGNOSIS — Z01.82 ENCOUNTER FOR ALLERGY TESTING: ICD-10-CM

## 2024-10-25 DIAGNOSIS — Z23 NEED FOR VACCINATION: ICD-10-CM

## 2024-10-25 DIAGNOSIS — Z87.19 HISTORY OF CONSTIPATION: ICD-10-CM

## 2024-10-25 DIAGNOSIS — K76.0 HEPATIC STEATOSIS: ICD-10-CM

## 2024-10-25 DIAGNOSIS — K62.5 RECTAL BLEEDING: ICD-10-CM

## 2024-10-25 DIAGNOSIS — K64.9 HEMORRHOIDS, UNSPECIFIED HEMORRHOID TYPE: ICD-10-CM

## 2024-10-25 DIAGNOSIS — Z87.19 HISTORY OF IBS: ICD-10-CM

## 2024-10-25 DIAGNOSIS — Z82.0 FAMILY HISTORY OF SLEEP APNEA: ICD-10-CM

## 2024-10-25 DIAGNOSIS — D22.9 CHANGE IN SKIN MOLE: ICD-10-CM

## 2024-10-25 DIAGNOSIS — Z76.89 ENCOUNTER TO ESTABLISH CARE: Primary | ICD-10-CM

## 2024-10-25 DIAGNOSIS — R10.84 GENERALIZED ABDOMINAL PAIN: ICD-10-CM

## 2024-10-25 PROCEDURE — 91320 SARSCV2 VAC 30MCG TRS-SUC IM: CPT | Performed by: NURSE PRACTITIONER

## 2024-10-25 PROCEDURE — 90471 IMMUNIZATION ADMIN: CPT | Performed by: NURSE PRACTITIONER

## 2024-10-25 PROCEDURE — 90656 IIV3 VACC NO PRSV 0.5 ML IM: CPT | Performed by: NURSE PRACTITIONER

## 2024-10-25 PROCEDURE — 99214 OFFICE O/P EST MOD 30 MIN: CPT | Performed by: NURSE PRACTITIONER

## 2024-10-25 PROCEDURE — 90480 ADMN SARSCOV2 VAC 1/ONLY CMP: CPT | Performed by: NURSE PRACTITIONER

## 2024-10-25 RX ORDER — PANTOPRAZOLE SODIUM 20 MG/1
20 TABLET, DELAYED RELEASE ORAL DAILY
Qty: 30 TABLET | Refills: 2 | Status: SHIPPED | OUTPATIENT
Start: 2024-10-25

## 2024-10-25 NOTE — PROGRESS NOTES
"    Office Note     Name: Jayla Lu    : 2000     MRN: [id number]     Chief Complaint  Establish Care (Referrals/)    Subjective     History of Present Illness:  Jayla Lu is a 24 y.o. adult who presents today to establish care with a new provider.  Her previous primary care provider is no longer in network with her insurance.    Past medical and medication history reviewed with the patient.  Patient does have a history of moderate diffuse fatty liver disease.  This was noted on liver ultrasound.  She denies any alcohol intake.  She denies any chronic Tylenol use.  She is on a vegan diet.  She does workout daily.  Her hepatitis C testing was negative.  Her total bilirubin has been consistently elevated for the past 6 months.  LFTs have remained normal.  BMI is 21.  Patient is not diabetic.  She is unsure of the reason for her fatty liver disease.    She does feel she has some food sensitivities.  She reports her symptoms have been better over the past few days but in general have been worse recently.  She was out of town recently in [place] and developed worsening gastric reflux, shortness of breath, and dizziness while eating.  She reports this occurs frequently and is the reason she is concerned about food allergies or intolerance.  She also feels like there is a \"rock\" in her stomach with eating.  She does have known hemorrhoids.  She noted some blood and mucus in her stool yesterday.  She is concerned for possible Crohn's disease.  She denies any constipation or straining with bowel movements.  She reports her bowel movements are normal.  She is taking a daily probiotic.  She does have a history of IBS and constipation.  She feels her GERD has been worse recently.  She has tried Tums and Benadryl after eating with no improvement in symptoms.  She would like a referral to GI for further evaluation of symptoms as well as fatty liver disease.    She would also like a referral to a " dermatologist.  She has a mole on her back that is concerning.  She reports it has changed shape recently.    She would also like to have a referral to an allergist for allergy testing.  Also to evaluate for any food intolerance or sensitivities.    Family history consist of diabetes in her aunts and her father had his gallbladder removed.    She would also like to have a referral to sleep medicine.  She feels she needs a sleep study.  She has had her tonsils and adenoids removed in the past.  She also reports her father had sleep apnea.    She denies any tobacco use.  She denies any recreational drug use.  She denies any alcohol use.  She is single.  No children.  She has 4 cats at home.  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    No further complaints or concerns at this time.  Pleasant visit with the patient today.      Past Medical History:   Diagnosis Date    Allergic     Allergic to Cefalexin and possible small food allergies    GERD (gastroesophageal reflux disease)        Past Surgical History:   Procedure Laterality Date    ADENOIDECTOMY      TONSILLECTOMY         Social History     Socioeconomic History    Marital status: Single   Tobacco Use    Smoking status: Every Day     Types: Cigarettes    Smokeless tobacco: Never   Vaping Use    Vaping status: Never Used   Substance and Sexual Activity    Alcohol use: Not Currently     Comment: I drink once every 4 months    Drug use: Never    Sexual activity: Yes     Partners: Female, Male     Birth control/protection: Condom, Abstinence, Partner of same sex         Current Outpatient Medications:     EPINEPHrine (EPIPEN) 0.3 MG/0.3ML solution auto-injector injection, Inject 0.3 mL under the skin into the appropriate area as directed 1 (One) Time As Needed (Anaphylaxis) for up to 1 dose., Disp: 1 each, Rfl: 0    mupirocin (BACTROBAN) 2 % ointment, Apply 1 Application topically to the appropriate area as directed 2 (Two) Times a Day., Disp: , Rfl:      "pantoprazole (PROTONIX) 20 MG EC tablet, Take 1 tablet by mouth Daily., Disp: 30 tablet, Rfl: 2    valACYclovir (Valtrex) 1000 MG tablet, Take 1 tablet by mouth Daily for 7 days., Disp: 7 tablet, Rfl: 0    Objective     Vital Signs  /70   Pulse 74   Temp 97.3 °F (36.3 °C) (Temporal)   Resp 16   Ht 162.6 cm (64.02\")   Wt 55.5 kg (122 lb 6.4 oz)   SpO2 98%   BMI 21.00 kg/m²   Estimated body mass index is 21 kg/m² as calculated from the following:    Height as of this encounter: 162.6 cm (64.02\").    Weight as of this encounter: 55.5 kg (122 lb 6.4 oz).    BMI is within normal parameters. No other follow-up for BMI required.      Physical Exam  Constitutional:       General: Oleksandr is not in acute distress.     Appearance: Normal appearance. Oleksandr is not ill-appearing.   HENT:      Head: Normocephalic and atraumatic.      Nose: Nose normal.   Eyes:      Extraocular Movements: Extraocular movements intact.      Conjunctiva/sclera: Conjunctivae normal.      Pupils: Pupils are equal, round, and reactive to light.      Comments: Glasses in place   Cardiovascular:      Rate and Rhythm: Normal rate and regular rhythm.      Heart sounds: Normal heart sounds.   Pulmonary:      Effort: Pulmonary effort is normal. No respiratory distress.      Breath sounds: Normal breath sounds.   Musculoskeletal:         General: Normal range of motion.      Cervical back: Neck supple.      Right lower leg: No edema.      Left lower leg: No edema.   Skin:     General: Skin is warm and dry.   Neurological:      General: No focal deficit present.      Mental Status: Oleksandr is alert and oriented to person, place, and time. Mental status is at baseline.   Psychiatric:         Mood and Affect: Mood normal.         Behavior: Behavior normal.         Thought Content: Thought content normal.         Judgment: Judgment normal.          Assessment and Plan     Diagnoses and all orders for this visit:    1. Encounter to establish care " (Primary)    2. Change in skin mole  -     Ambulatory Referral to Dermatology    3. Gastroesophageal reflux disease, unspecified whether esophagitis present  -     Ambulatory Referral to Gastroenterology  -     pantoprazole (PROTONIX) 20 MG EC tablet; Take 1 tablet by mouth Daily.  Dispense: 30 tablet; Refill: 2    4. Sleep disturbance  -     Ambulatory Referral to Sleep Medicine    5. History of constipation  -     Ambulatory Referral to Gastroenterology    6. Hemorrhoids, unspecified hemorrhoid type  -     Ambulatory Referral to Gastroenterology    7. Rectal bleeding  -     Ambulatory Referral to Gastroenterology    8. History of IBS  -     Ambulatory Referral to Gastroenterology    9. Generalized abdominal pain  -     Ambulatory Referral to Gastroenterology    10. Hepatic steatosis  -     Ambulatory Referral to Gastroenterology    11. Elevated bilirubin  -     Ambulatory Referral to Gastroenterology    12. Encounter for allergy testing  -     Ambulatory Referral to Allergy    13. Family history of sleep apnea  -     Ambulatory Referral to Sleep Medicine    14. Need for vaccination  -     Fluzone >6mos ([id number])  -     COVID-19 (Pfizer) 12yrs+ (COMIRNATY)        Follow Up  Return in about 3 months (around 1/25/2025) for Recheck.    ELIZA Moreno    Part of this note may be an electronic transcription/translation of spoken language to printed text using the Dragon Dictation System.

## 2024-10-31 ENCOUNTER — PATIENT ROUNDING (BHMG ONLY) (OUTPATIENT)
Dept: INTERNAL MEDICINE | Facility: CLINIC | Age: 24
End: 2024-10-31
Payer: OTHER GOVERNMENT

## 2024-10-31 ENCOUNTER — TELEMEDICINE (OUTPATIENT)
Dept: FAMILY MEDICINE CLINIC | Facility: TELEHEALTH | Age: 24
End: 2024-10-31
Payer: OTHER GOVERNMENT

## 2024-10-31 DIAGNOSIS — B00.9 HERPES SIMPLEX: Primary | ICD-10-CM

## 2024-10-31 RX ORDER — VALACYCLOVIR HYDROCHLORIDE 1 G/1
1000 TABLET, FILM COATED ORAL DAILY
Qty: 7 TABLET | Refills: 0 | Status: SHIPPED | OUTPATIENT
Start: 2024-10-31 | End: 2024-11-07

## 2024-10-31 NOTE — PROGRESS NOTES
You have chosen to receive care through a telehealth visit.  Do you consent to use a video/audio connection for your medical care today? Yes     CHIEF COMPLAINT  Chief Complaint   Patient presents with    Mouth Lesions         HPI  Jayla Lu is a 24 y.o. adult  presents with complaint of tingling and soreness to bottom liip.  She states that she ishaving a flare up of cold sores and needs Valtrex    Review of Systems   HENT:  Positive for mouth sores.    All other systems reviewed and are negative.      Past Medical History:   Diagnosis Date    Allergic     Allergic to Cefalexin and possible small food allergies    GERD (gastroesophageal reflux disease)        Family History   Problem Relation Age of Onset    Gallbladder disease Father     Diabetes Paternal Aunt     Diabetes Paternal Uncle        Social History     Socioeconomic History    Marital status: Single   Tobacco Use    Smoking status: Every Day     Types: Cigarettes    Smokeless tobacco: Never   Vaping Use    Vaping status: Never Used   Substance and Sexual Activity    Alcohol use: Not Currently     Comment: I drink once every 4 months    Drug use: Never    Sexual activity: Yes     Partners: Female, Male     Birth control/protection: Condom, Abstinence, Partner of same sex       Jayla Lu  reports that Oleksandr has been smoking cigarettes. Oleksandr has never used smokeless tobacco. I have educated Oleksandr on the risk of diseases from using tobacco products such as cancer, COPD, and heart disease.     I advised Oleksandr to quit and Oleksandr is not willing to quit.    I spent  1  minutes counseling the patient.              LMP 09/30/2024 (Approximate)     PHYSICAL EXAM  Physical Exam   Constitutional: Oleksandr appears well-developed and well-nourished.   HENT:   Head: Normocephalic.   Eyes: Pupils are equal, round, and reactive to light.   Pulmonary/Chest: Effort normal.   Musculoskeletal: Normal range of motion.   Neurological: Oleksandr is alert.   Psychiatric: Oleksandr has a  normal mood and affect.       Results for orders placed or performed in visit on 06/24/24   Comprehensive Metabolic Panel    Collection Time: 06/24/24  3:53 PM    Specimen: Blood   Result Value Ref Range    Glucose 84 65 - 99 mg/dL    BUN 10 6 - 20 mg/dL    Creatinine 0.81 0.57 - 1.00 mg/dL    Sodium 138 136 - 145 mmol/L    Potassium 4.0 3.5 - 5.2 mmol/L    Chloride 100 98 - 107 mmol/L    CO2 27.0 22.0 - 29.0 mmol/L    Calcium 9.1 8.6 - 10.5 mg/dL    Total Protein 7.4 6.0 - 8.5 g/dL    Albumin 4.4 3.5 - 5.2 g/dL    ALT (SGPT) 15 1 - 33 U/L    AST (SGOT) 20 1 - 32 U/L    Alkaline Phosphatase 88 39 - 117 U/L    Total Bilirubin 1.9 (H) 0.0 - 1.2 mg/dL    Globulin 3.0 gm/dL    A/G Ratio 1.5 g/dL    BUN/Creatinine Ratio 12.3 7.0 - 25.0    Anion Gap 11.0 5.0 - 15.0 mmol/L    eGFR 104.8 >60.0 mL/min/1.73       Diagnoses and all orders for this visit:    1. Herpes simplex (Primary)  -     valACYclovir (Valtrex) 1000 MG tablet; Take 1 tablet by mouth Daily for 7 days.  Dispense: 7 tablet; Refill: 0          FOLLOW-UP  As discussed during visit with PCP/[facility] if no improvement or Urgent Care/Emergency Department if worsening of symptoms    Patient verbalizes understanding of medication dosage, comfort measures, instructions for treatment and follow-up.    ELIZA Watkins  10/31/2024  06:38 EDT    Mode of Visit: Video  Location of patient: Home  Location of provider: Home  You have chosen to receive care through a telehealth visit.  The patient has signed the video visit consent form.  The visit included audio and video interaction. No technical issues occurred during this visit.      Note Disclaimer: At [facility], we believe that sharing information builds trust and better   relationships. You are receiving this note because you recently visited [facility]. It is possible you   will see health information before a provider has talked with you about it. This kind of information can   be easy to  misunderstand. To help you fully understand what it means for your health, we urge you to   discuss this note with your provider.

## 2024-10-31 NOTE — PROGRESS NOTES
A My-chart message has been sent to the patient for Patient Rounding with [facility].

## 2024-12-11 ENCOUNTER — OFFICE VISIT (OUTPATIENT)
Dept: INTERNAL MEDICINE | Facility: CLINIC | Age: 24
End: 2024-12-11
Payer: OTHER GOVERNMENT

## 2024-12-11 VITALS
HEART RATE: 78 BPM | WEIGHT: 120 LBS | OXYGEN SATURATION: 98 % | DIASTOLIC BLOOD PRESSURE: 64 MMHG | HEIGHT: 64 IN | SYSTOLIC BLOOD PRESSURE: 92 MMHG | BODY MASS INDEX: 20.49 KG/M2

## 2024-12-11 DIAGNOSIS — K21.9 GASTROESOPHAGEAL REFLUX DISEASE, UNSPECIFIED WHETHER ESOPHAGITIS PRESENT: ICD-10-CM

## 2024-12-11 DIAGNOSIS — N89.8 VAGINAL ITCHING: ICD-10-CM

## 2024-12-11 DIAGNOSIS — Z20.2 EXPOSURE TO STD: ICD-10-CM

## 2024-12-11 DIAGNOSIS — Z01.419 WELL WOMAN EXAM: ICD-10-CM

## 2024-12-11 DIAGNOSIS — B00.9 HSV-2 (HERPES SIMPLEX VIRUS 2) INFECTION: ICD-10-CM

## 2024-12-11 DIAGNOSIS — Z00.00 ANNUAL PHYSICAL EXAM: Primary | ICD-10-CM

## 2024-12-11 LAB
BILIRUB BLD-MCNC: NEGATIVE MG/DL
CLARITY, POC: ABNORMAL
COLOR UR: YELLOW
EXPIRATION DATE: ABNORMAL
GLUCOSE UR STRIP-MCNC: NEGATIVE MG/DL
KETONES UR QL: NEGATIVE
LEUKOCYTE EST, POC: NEGATIVE
Lab: ABNORMAL
NITRITE UR-MCNC: NEGATIVE MG/ML
PH UR: 6 [PH] (ref 5–8)
PROT UR STRIP-MCNC: ABNORMAL MG/DL
RBC # UR STRIP: NEGATIVE /UL
SP GR UR: 1.03 (ref 1–1.03)
UROBILINOGEN UR QL: ABNORMAL

## 2024-12-11 PROCEDURE — 87798 DETECT AGENT NOS DNA AMP: CPT | Performed by: NURSE PRACTITIONER

## 2024-12-11 PROCEDURE — 87591 N.GONORRHOEAE DNA AMP PROB: CPT | Performed by: NURSE PRACTITIONER

## 2024-12-11 PROCEDURE — 87661 TRICHOMONAS VAGINALIS AMPLIF: CPT | Performed by: NURSE PRACTITIONER

## 2024-12-11 PROCEDURE — 87491 CHLMYD TRACH DNA AMP PROBE: CPT | Performed by: NURSE PRACTITIONER

## 2024-12-11 PROCEDURE — 81003 URINALYSIS AUTO W/O SCOPE: CPT | Performed by: NURSE PRACTITIONER

## 2024-12-11 PROCEDURE — 87801 DETECT AGNT MULT DNA AMPLI: CPT | Performed by: NURSE PRACTITIONER

## 2024-12-11 PROCEDURE — 99395 PREV VISIT EST AGE 18-39: CPT | Performed by: NURSE PRACTITIONER

## 2024-12-11 RX ORDER — CEFTRIAXONE 500 MG/1
500 INJECTION, POWDER, FOR SOLUTION INTRAMUSCULAR; INTRAVENOUS ONCE
Qty: 1 EACH | Refills: 0 | Status: SHIPPED | OUTPATIENT
Start: 2024-12-11 | End: 2024-12-11

## 2024-12-11 NOTE — PROGRESS NOTES
Annual Physical     Name: Jayla Lu    : 2000     MRN: [id number]     Chief Complaint  Annual Exam (Physical ), Exposure to STD (Patient reports today for a full check up on stds. Patient states that she had sexual intercourse with a new partner who took his condom off during and she wants to be on the safe side and get treatment. Patient states that she has vaginal itching but she does not know if he for sure does not have anything.), and Vaginal Itching    Subjective     History of Present Illness:  Jyala Lu is a 24 y.o. adult who presents today for annual physical exam.    ENT- no food allergies.    The patient is being seen for a health maintenance evaluation.    Past Medical History:   Diagnosis Date    Allergic     Allergic to Cefalexin and possible small food allergies    GERD (gastroesophageal reflux disease)        Past Surgical History:   Procedure Laterality Date    ADENOIDECTOMY      TONSILLECTOMY         Social History     Socioeconomic History    Marital status: Single   Tobacco Use    Smoking status: Every Day     Current packs/day: 2.00     Average packs/day: 2.0 packs/day for 3.5 years (7.0 ttl pk-yrs)     Types: Cigarettes     Start date: 2021    Smokeless tobacco: Never   Vaping Use    Vaping status: Never Used   Substance and Sexual Activity    Alcohol use: Not Currently     Comment: I drink once every 4 months    Drug use: Never    Sexual activity: Yes     Partners: Female, Male     Birth control/protection: Condom, Abstinence, Partner of same sex         Current Outpatient Medications:     mupirocin (BACTROBAN) 2 % ointment, Apply 1 Application topically to the appropriate area as directed 2 (Two) Times a Day., Disp: , Rfl:     pantoprazole (PROTONIX) 20 MG EC tablet, Take 1 tablet by mouth Daily., Disp: 30 tablet, Rfl: 5    valACYclovir (Valtrex) 1000 MG tablet, Take 1 tablet by mouth 2 (Two) Times a Day As Needed (outbreak)., Disp: 30 tablet, Rfl: 2    General  "History  Jayla  does not have regular dental visits.  She does complain of vision problems. Last eye exam was 2023. Wears glasses.  Immunizations are not up to date. The patient needs the following immunizations: Tdap, pneumonia, and HPV.    Lifestyle  Jayla  consumes in general, a \"healthy\" diet  , vegan .  She exercises daily.    Reproductive Health  Jayla  is premenopausal.  She reports periods are regular every 28-30 days.  She is sexually active. Her contraceptive plan is condoms.    Screening  Last pap was several years ago.  Last Completed Pap Smear       This patient has no relevant Health Maintenance data.        . History of abnormal pap smear or family history of gyn cancer: No abnormal pap in the past. No FH.  Last mammogram was never.  Last Completed Mammogram       This patient has no relevant Health Maintenance data.        . Personal or family history of abnormal mammograms or breast cancer: No FH.  Last colonoscopy was never.  Last Completed Colonoscopy       This patient has no relevant Health Maintenance data.        . Family history of colon cancer: No FH.  Last DEXA was never.    Health Maintenance Summary            Overdue - HPV VACCINES (1 - 3-dose series) Never done      12/11/2024  Postponed until 12/11/2024 by Carmen Sandoval MA (Patient Refused)              Overdue - PAP SMEAR (Every 3 Years) Never done      No completion, postpone, or frequency change history exists for this topic.              Postponed - Pneumococcal Vaccine 0-64 (1 of 1 - PPSV23 or PCV20) Postponed until 12/11/2025 12/11/2024  Postponed until 12/11/2025 by Carmen Sandoval MA (Patient Refused)    2000  Imm Admin: Pneumococcal Conjugate 13-Valent (PCV13)    2000  Imm Admin: Pneumococcal Conjugate 13-Valent (PCV13)    2000  Imm Admin: Pneumococcal Conjugate 13-Valent (PCV13)              Postponed - TDAP/TD VACCINES (2 - Td or Tdap) Postponed until 12/11/2025 12/11/2024  Postponed " until 12/11/2025 by Carmen Sandoval MA (Patient Refused)    04/11/2012  Imm Admin: Tdap              ANNUAL PHYSICAL (Yearly) Next due on 12/11/2025 12/11/2024  Done              CHLAMYDIA SCREENING (Yearly) Next due on 12/11/2025 12/11/2024  Chlamydia trachomatis, DEVORAH component of NuSwab VG+ - Swab, Vagina    06/24/2024  Chlamydia trachomatis, DEVORAH component of NuSwab VG+ - Swab, Vagina    06/05/2024  Chlamydia trachomatis, DEVORAH component of NuSwab VG+ - Swab, Vagina    03/01/2022  Chlamydia trachomatis, DEVORAH component of Chlamydia trachomatis, Neisseria gonorrhoeae, PCR - Urine, Urine, Random Void              HEPATITIS C SCREENING  Completed      06/05/2024  Hepatitis C RNA, quantitative, PCR (graph)              COVID-19 Vaccine (Series Information) Completed      10/25/2024  Imm Admin: COVID-19 (PFIZER) 12YRS+ (COMIRNATY)              INFLUENZA VACCINE  Completed      10/25/2024  Imm Admin: Fluzone  >6mos    10/01/2018  Imm Admin: Fluzone Quad >6mos (Multi-dose)                  Immunization History   Administered Date(s) Administered    COVID-19 (PFIZER) 12YRS+ (COMIRNATY) 10/25/2024    DTaP 2000, 2000, 2000, 08/12/2002, 02/11/2005    DTaP / Hep B / IPV 2000, 2001, 08/12/2002, 02/11/2005    Fluzone  >6mos 10/25/2024    Fluzone Quad >6mos (Multi-dose) 10/01/2018    Hep A, 2 Dose 02/11/2003, 06/30/2017    Hep B, Adolescent or Pediatric 2000, 2001, 02/05/2002    Hib (HbOC) 2000, 2001, 02/05/2002    MMR 02/05/2002, 02/11/2005    Meningococcal Conjugate 04/11/2012, 06/30/2017    Pneumococcal Conjugate 13-Valent (PCV13) 2000, 2001, 2001    Tdap 04/11/2012    Varicella 02/05/2002, 01/30/2007       Review of Systems   Constitutional: Negative.    HENT: Negative.     Respiratory: Negative.     Cardiovascular: Negative.    Gastrointestinal: Negative.    Genitourinary: Negative.    Musculoskeletal: Negative.    Neurological: Negative.   "  Psychiatric/Behavioral: Negative.         Objective     Vital Signs  BP 92/64   Pulse 78   Ht 162.6 cm (64.02\")   Wt 54.4 kg (120 lb)   SpO2 98%   BMI 20.59 kg/m²   Estimated body mass index is 20.59 kg/m² as calculated from the following:    Height as of this encounter: 162.6 cm (64.02\").    Weight as of this encounter: 54.4 kg (120 lb).    BMI is within normal parameters. No other follow-up for BMI required.       Physical Exam  Constitutional:       General: Oleksandr is awake. Oleksandr is not in acute distress.     Appearance: Normal appearance. Oleksandr is well-developed, well-groomed and normal weight. Oleksandr is not ill-appearing.   HENT:      Head: Normocephalic and atraumatic.      Nose: Nose normal.      Mouth/Throat:      Mouth: Mucous membranes are moist.      Pharynx: Oropharynx is clear.   Eyes:      General: No scleral icterus.     Extraocular Movements: Extraocular movements intact.      Conjunctiva/sclera: Conjunctivae normal.      Pupils: Pupils are equal, round, and reactive to light.   Neck:      Trachea: Trachea normal.   Cardiovascular:      Rate and Rhythm: Normal rate and regular rhythm.      Pulses: Normal pulses.      Heart sounds: Normal heart sounds. No murmur heard.  Pulmonary:      Effort: Pulmonary effort is normal. No tachypnea, accessory muscle usage or respiratory distress.      Breath sounds: Normal breath sounds. No wheezing, rhonchi or rales.   Abdominal:      General: Abdomen is flat. Bowel sounds are normal. There is no distension.      Palpations: Abdomen is soft.      Tenderness: There is no abdominal tenderness.   Musculoskeletal:         General: No swelling. Normal range of motion.      Cervical back: Normal range of motion and neck supple. No tenderness.      Right lower leg: No edema.      Left lower leg: No edema.   Skin:     General: Skin is warm and dry.      Capillary Refill: Capillary refill takes less than 2 seconds.      Coloration: Skin is not jaundiced or pale.      " Findings: No lesion or rash.   Neurological:      General: No focal deficit present.      Mental Status: Oleksandr is alert and oriented to person, place, and time. Mental status is at baseline.      Motor: No weakness.      Gait: Gait is intact.   Psychiatric:         Attention and Perception: Attention normal.         Mood and Affect: Mood normal.         Speech: Speech normal.         Behavior: Behavior normal. Behavior is cooperative.         Thought Content: Thought content normal.         Judgment: Judgment normal.          Assessment and Plan     Diagnoses and all orders for this visit:    1. Annual physical exam (Primary)    2. Exposure to STD  -     NuSwab VG+ - Swab, Vagina; Future  -     NuSwab VG+ - Swab, Vagina  -     doxycycline (VIBRAMYCIN) 100 MG capsule; Take 1 capsule by mouth 2 (Two) Times a Day for 5 days.  Dispense: 10 capsule; Refill: 0  -     metroNIDAZOLE (FLAGYL) 500 MG tablet; Take 1 tablet by mouth 2 (Two) Times a Day for 5 days.  Dispense: 10 tablet; Refill: 0    3. Vaginal itching  -     POCT urinalysis dipstick, automated    4. Gastroesophageal reflux disease, unspecified whether esophagitis present  -     pantoprazole (PROTONIX) 20 MG EC tablet; Take 1 tablet by mouth Daily.  Dispense: 30 tablet; Refill: 5    5. Well woman exam  -     Ambulatory Referral to Gynecology    6. HSV-2 (herpes simplex virus 2) infection  -     valACYclovir (Valtrex) 1000 MG tablet; Take 1 tablet by mouth 2 (Two) Times a Day As Needed (outbreak).  Dispense: 30 tablet; Refill: 2    Other orders  -     cefTRIAXone (ROCEPHIN) 500 MG injection; Inject 500 mg into the appropriate muscle as directed by prescriber 1 (One) Time for 1 dose.  Dispense: 1 each; Refill: 0        Plan:  Annual physical exam.  NuSwab obtained in the office today.  Patient would like to go ahead and prophylactically be treated for STIs due to recent exposure.  Will review NuSwab results with patient once received.  Referral placed to gynecology  for well woman exam.  Medication refill sent to the pharmacy on file.  Lab orders previously placed.  Patient has not had these completed yet.  Continue with up to date immunizations and health maintenance screenings.  Continue with healthy lifestyle choices such as healthy diet and eating habits and regular exercise routine.  Continue with adequate oral hydration and rest.  Annual physical exam in 1 year.  Return to clinic sooner if needed.    Follow Up  Return in about 1 year (around 12/11/2025), or if symptoms worsen or fail to improve, for Annual.    ELIZA Moreno    Part of this note may be an electronic transcription/translation of spoken language to printed text using the Dragon Dictation System.

## 2024-12-13 ENCOUNTER — PRIOR AUTHORIZATION (OUTPATIENT)
Dept: INTERNAL MEDICINE | Facility: CLINIC | Age: 24
End: 2024-12-13
Payer: OTHER GOVERNMENT

## 2024-12-13 RX ORDER — DOXYCYCLINE 100 MG/1
100 CAPSULE ORAL 2 TIMES DAILY
Qty: 10 CAPSULE | Refills: 0 | Status: SHIPPED | OUTPATIENT
Start: 2024-12-13 | End: 2024-12-18

## 2024-12-13 RX ORDER — VALACYCLOVIR HYDROCHLORIDE 1 G/1
1000 TABLET, FILM COATED ORAL 2 TIMES DAILY PRN
Qty: 30 TABLET | Refills: 2 | Status: SHIPPED | OUTPATIENT
Start: 2024-12-13

## 2024-12-13 RX ORDER — METRONIDAZOLE 500 MG/1
500 TABLET ORAL 2 TIMES DAILY
Qty: 10 TABLET | Refills: 0 | Status: SHIPPED | OUTPATIENT
Start: 2024-12-13 | End: 2024-12-18

## 2024-12-13 RX ORDER — PANTOPRAZOLE SODIUM 20 MG/1
20 TABLET, DELAYED RELEASE ORAL DAILY
Qty: 30 TABLET | Refills: 5 | Status: SHIPPED | OUTPATIENT
Start: 2024-12-13

## 2024-12-13 NOTE — TELEPHONE ENCOUNTER
I received a PA request for ceftriaxone. Did patient receive this injection at her appointment or was she supposed to do this injection at home? I will start PA if needed.

## 2024-12-15 LAB
A VAGINAE DNA VAG QL NAA+PROBE: ABNORMAL SCORE
BVAB2 DNA VAG QL NAA+PROBE: ABNORMAL SCORE
C ALBICANS DNA VAG QL NAA+PROBE: NEGATIVE
C GLABRATA DNA VAG QL NAA+PROBE: NEGATIVE
C TRACH DNA SPEC QL NAA+PROBE: NEGATIVE
MEGA1 DNA VAG QL NAA+PROBE: ABNORMAL SCORE
N GONORRHOEA DNA VAG QL NAA+PROBE: NEGATIVE
T VAGINALIS DNA VAG QL NAA+PROBE: NEGATIVE

## 2025-01-13 ENCOUNTER — TELEPHONE (OUTPATIENT)
Dept: INTERNAL MEDICINE | Facility: CLINIC | Age: 25
End: 2025-01-13
Payer: OTHER GOVERNMENT

## 2025-01-13 NOTE — TELEPHONE ENCOUNTER
LVM for pt to return call to office. Left office number.     Patient needs to go to ED or schedule with gyn if she is having a true miscarriage.

## 2025-02-22 DIAGNOSIS — K21.9 GASTROESOPHAGEAL REFLUX DISEASE, UNSPECIFIED WHETHER ESOPHAGITIS PRESENT: ICD-10-CM

## 2025-02-22 RX ORDER — PANTOPRAZOLE SODIUM 20 MG/1
20 TABLET, DELAYED RELEASE ORAL DAILY
Qty: 30 TABLET | Refills: 5 | Status: CANCELLED | OUTPATIENT
Start: 2025-02-22

## 2025-02-23 PROCEDURE — 87591 N.GONORRHOEAE DNA AMP PROB: CPT | Performed by: NURSE PRACTITIONER

## 2025-02-23 PROCEDURE — 87086 URINE CULTURE/COLONY COUNT: CPT | Performed by: NURSE PRACTITIONER

## 2025-02-23 PROCEDURE — 87798 DETECT AGENT NOS DNA AMP: CPT | Performed by: NURSE PRACTITIONER

## 2025-02-23 PROCEDURE — 87529 HSV DNA AMP PROBE: CPT | Performed by: NURSE PRACTITIONER

## 2025-02-23 PROCEDURE — 87801 DETECT AGNT MULT DNA AMPLI: CPT | Performed by: NURSE PRACTITIONER

## 2025-02-23 PROCEDURE — 87491 CHLMYD TRACH DNA AMP PROBE: CPT | Performed by: NURSE PRACTITIONER

## 2025-02-23 PROCEDURE — 87661 TRICHOMONAS VAGINALIS AMPLIF: CPT | Performed by: NURSE PRACTITIONER

## 2025-05-25 PROCEDURE — 87661 TRICHOMONAS VAGINALIS AMPLIF: CPT

## 2025-05-25 PROCEDURE — 87591 N.GONORRHOEAE DNA AMP PROB: CPT

## 2025-05-25 PROCEDURE — 87798 DETECT AGENT NOS DNA AMP: CPT

## 2025-05-25 PROCEDURE — 87491 CHLMYD TRACH DNA AMP PROBE: CPT

## 2025-05-25 PROCEDURE — 87801 DETECT AGNT MULT DNA AMPLI: CPT

## 2025-05-28 ENCOUNTER — RESULTS FOLLOW-UP (OUTPATIENT)
Dept: URGENT CARE | Facility: CLINIC | Age: 25
End: 2025-05-28
Payer: OTHER GOVERNMENT

## 2025-05-28 NOTE — TELEPHONE ENCOUNTER
5/28/25 1st call, NA, LVM to CB     -  Result Note  NuSwab neg for BV, Candida, trichomonas, chlamydia or gonorrhea.  Please notify patient of results.

## 2025-05-30 DIAGNOSIS — B00.9 HSV-2 (HERPES SIMPLEX VIRUS 2) INFECTION: ICD-10-CM

## 2025-05-30 RX ORDER — VALACYCLOVIR HYDROCHLORIDE 1 G/1
1000 TABLET, FILM COATED ORAL 2 TIMES DAILY PRN
Qty: 30 TABLET | Refills: 0 | Status: SHIPPED | OUTPATIENT
Start: 2025-05-30

## 2025-06-12 ENCOUNTER — OFFICE VISIT (OUTPATIENT)
Dept: GASTROENTEROLOGY | Facility: CLINIC | Age: 25
End: 2025-06-12
Payer: OTHER GOVERNMENT

## 2025-06-12 ENCOUNTER — LAB (OUTPATIENT)
Dept: LAB | Facility: HOSPITAL | Age: 25
End: 2025-06-12
Payer: OTHER GOVERNMENT

## 2025-06-12 VITALS
HEART RATE: 67 BPM | BODY MASS INDEX: 22.02 KG/M2 | TEMPERATURE: 97.3 F | HEIGHT: 64 IN | SYSTOLIC BLOOD PRESSURE: 118 MMHG | OXYGEN SATURATION: 98 % | DIASTOLIC BLOOD PRESSURE: 78 MMHG | WEIGHT: 129 LBS

## 2025-06-12 DIAGNOSIS — K76.0 FATTY LIVER: ICD-10-CM

## 2025-06-12 DIAGNOSIS — K21.9 GASTROESOPHAGEAL REFLUX DISEASE, UNSPECIFIED WHETHER ESOPHAGITIS PRESENT: Primary | ICD-10-CM

## 2025-06-12 LAB
ALBUMIN SERPL-MCNC: 4.8 G/DL (ref 3.5–5.2)
ALBUMIN/GLOB SERPL: 1.5 G/DL
ALP SERPL-CCNC: 87 U/L (ref 39–117)
ALT SERPL W P-5'-P-CCNC: 17 U/L (ref 1–33)
ANION GAP SERPL CALCULATED.3IONS-SCNC: 15 MMOL/L (ref 5–15)
AST SERPL-CCNC: 26 U/L (ref 1–32)
BILIRUB SERPL-MCNC: 0.7 MG/DL (ref 0–1.2)
BUN SERPL-MCNC: 11 MG/DL (ref 6–20)
BUN/CREAT SERPL: 14.3 (ref 7–25)
CALCIUM SPEC-SCNC: 9.4 MG/DL (ref 8.6–10.5)
CERULOPLASMIN SERPL-MCNC: 21 MG/DL (ref 19–39)
CHLORIDE SERPL-SCNC: 106 MMOL/L (ref 98–107)
CO2 SERPL-SCNC: 20 MMOL/L (ref 22–29)
CREAT SERPL-MCNC: 0.77 MG/DL (ref 0.57–1)
DEPRECATED RDW RBC AUTO: 49.5 FL (ref 37–54)
EGFRCR SERPLBLD CKD-EPI 2021: 110.6 ML/MIN/1.73
ERYTHROCYTE [DISTWIDTH] IN BLOOD BY AUTOMATED COUNT: 15.6 % (ref 12.3–15.4)
FERRITIN SERPL-MCNC: 12.2 NG/ML (ref 13–150)
GLOBULIN UR ELPH-MCNC: 3.2 GM/DL
GLUCOSE SERPL-MCNC: 87 MG/DL (ref 65–99)
HAV IGM SERPL QL IA: NORMAL
HBV CORE IGM SERPL QL IA: NORMAL
HBV SURFACE AB SER RIA-ACNC: REACTIVE
HBV SURFACE AG SERPL QL IA: NORMAL
HCT VFR BLD AUTO: 40.9 % (ref 34–46.6)
HCV AB SER QL: NORMAL
HGB BLD-MCNC: 12.7 G/DL (ref 12–15.9)
IGA1 MFR SER: 199 MG/DL (ref 70–400)
IGG1 SER-MCNC: 1389 MG/DL (ref 700–1600)
IGM SERPL-MCNC: 207 MG/DL (ref 40–230)
INR PPP: 0.95 (ref 0.89–1.12)
IRON 24H UR-MRATE: 33 MCG/DL (ref 37–145)
IRON SATN MFR SERPL: 6 % (ref 20–50)
MCH RBC QN AUTO: 27 PG (ref 26.6–33)
MCHC RBC AUTO-ENTMCNC: 31.1 G/DL (ref 31.5–35.7)
MCV RBC AUTO: 87 FL (ref 79–97)
PLATELET # BLD AUTO: 195 10*3/MM3 (ref 140–450)
PMV BLD AUTO: 11.8 FL (ref 6–12)
POTASSIUM SERPL-SCNC: 4.1 MMOL/L (ref 3.5–5.2)
PROT SERPL-MCNC: 8 G/DL (ref 6–8.5)
PROTHROMBIN TIME: 13.2 SECONDS (ref 12.2–15.3)
RBC # BLD AUTO: 4.7 10*6/MM3 (ref 3.77–5.28)
SODIUM SERPL-SCNC: 141 MMOL/L (ref 136–145)
TIBC SERPL-MCNC: 510 MCG/DL (ref 298–536)
TRANSFERRIN SERPL-MCNC: 342 MG/DL (ref 200–360)
WBC NRBC COR # BLD AUTO: 4 10*3/MM3 (ref 3.4–10.8)

## 2025-06-12 PROCEDURE — 83010 ASSAY OF HAPTOGLOBIN QUANT: CPT

## 2025-06-12 PROCEDURE — 85027 COMPLETE CBC AUTOMATED: CPT | Performed by: PHYSICIAN ASSISTANT

## 2025-06-12 PROCEDURE — 82103 ALPHA-1-ANTITRYPSIN TOTAL: CPT

## 2025-06-12 PROCEDURE — 80074 ACUTE HEPATITIS PANEL: CPT

## 2025-06-12 PROCEDURE — 82104 ALPHA-1-ANTITRYPSIN PHENO: CPT

## 2025-06-12 PROCEDURE — 84165 PROTEIN E-PHORESIS SERUM: CPT

## 2025-06-12 PROCEDURE — 84466 ASSAY OF TRANSFERRIN: CPT

## 2025-06-12 PROCEDURE — 86231 EMA EACH IG CLASS: CPT

## 2025-06-12 PROCEDURE — 83540 ASSAY OF IRON: CPT

## 2025-06-12 PROCEDURE — 82784 ASSAY IGA/IGD/IGG/IGM EACH: CPT

## 2025-06-12 PROCEDURE — 86364 TISS TRNSGLTMNASE EA IG CLAS: CPT

## 2025-06-12 PROCEDURE — 82465 ASSAY BLD/SERUM CHOLESTEROL: CPT

## 2025-06-12 PROCEDURE — 82172 ASSAY OF APOLIPOPROTEIN: CPT

## 2025-06-12 PROCEDURE — 83883 ASSAY NEPHELOMETRY NOT SPEC: CPT

## 2025-06-12 PROCEDURE — 86381 MITOCHONDRIAL ANTIBODY EACH: CPT

## 2025-06-12 PROCEDURE — 36415 COLL VENOUS BLD VENIPUNCTURE: CPT

## 2025-06-12 PROCEDURE — 86038 ANTINUCLEAR ANTIBODIES: CPT

## 2025-06-12 PROCEDURE — 82728 ASSAY OF FERRITIN: CPT

## 2025-06-12 PROCEDURE — 82390 ASSAY OF CERULOPLASMIN: CPT | Performed by: PHYSICIAN ASSISTANT

## 2025-06-12 PROCEDURE — 82977 ASSAY OF GGT: CPT

## 2025-06-12 PROCEDURE — 84478 ASSAY OF TRIGLYCERIDES: CPT

## 2025-06-12 PROCEDURE — 86708 HEPATITIS A ANTIBODY: CPT

## 2025-06-12 PROCEDURE — 85610 PROTHROMBIN TIME: CPT

## 2025-06-12 PROCEDURE — 86706 HEP B SURFACE ANTIBODY: CPT

## 2025-06-12 PROCEDURE — 80053 COMPREHEN METABOLIC PANEL: CPT

## 2025-06-12 PROCEDURE — 86015 ACTIN ANTIBODY EACH: CPT

## 2025-06-12 RX ORDER — PANTOPRAZOLE SODIUM 40 MG/1
40 TABLET, DELAYED RELEASE ORAL DAILY
Qty: 90 TABLET | Refills: 3 | Status: SHIPPED | OUTPATIENT
Start: 2025-06-12

## 2025-06-12 NOTE — PROGRESS NOTES
New Patient Consultation     Patient Name: Jayla Lu  : 2000   MRN: [id number]     Chief Complaint:    Chief Complaint   Patient presents with    Establish Care     PCP Referral due to Gastroesophageal reflux disease, unspecified whether esophagitis present, IBS, and abdominal pain.       History of Present Illness: Jayla Lu is a 25 y.o. adult who is here today for a Gastroenterology Consultation for GERD, fatty liver.     Pt notes one year h/o esophageal reflux, indigestion. They were previously taking pantoprazole 40mg daily but admittedly has not remembered to  refills in a few months. They endorse good improvement of sx with regular use of PPI.    Pt has 1-2 complete, soft BM daily with addition of more fiber in their diet.     Pt reports concern over finding of fatty liver via US last year. CMP with normal LFTs 2024.     Patient denies associated fever, chills, nausea, vomiting, diarrhea, constipation, hematemesis, dysphagia, hematochezia, melena, weight loss or gain, dysuria, jaundice or bruising.    Patient denies personal or FHx of PUD, H Pylori, gastritis, pancreatitis, colitis, Celiac disease, UC, Crohn's disease, IBS, colon or gastric cancers. Pt denies EtOH, tobacco, illicit substance or NSAID use. No previous abdominal surgeries.     Liver US 2024: Moderate diffuse hepatic steatosis. No acute findings in the right upper quadrant otherwise.     No previous EGD / CSY.     Subjective      Review of Systems:   Review of Systems   Constitutional:  Negative for appetite change, chills, diaphoresis, fatigue, fever, unexpected weight gain and unexpected weight loss.   HENT:  Negative for drooling, facial swelling, mouth sores, nosebleeds, rhinorrhea, sore throat, swollen glands, tinnitus and trouble swallowing.    Eyes: Negative.    Respiratory:  Negative for choking, chest tightness and shortness of breath.    Gastrointestinal:  Positive for GERD and indigestion. Negative  for abdominal pain, anal bleeding, blood in stool, constipation, diarrhea, nausea and vomiting.   Endocrine: Negative.    Genitourinary:  Negative for dysuria, flank pain and hematuria.   All other systems reviewed and are negative.      Past Medical History:   Past Medical History:   Diagnosis Date    Allergic     Allergic to Cefalexin and possible small food allergies    Gender dysphoria in adult     GERD (gastroesophageal reflux disease)        Past Surgical History:   Past Surgical History:   Procedure Laterality Date    ADENOIDECTOMY      TONSILLECTOMY         Family History:   Family History   Problem Relation Age of Onset    Gallbladder disease Father     Diabetes Paternal Aunt     Diabetes Paternal Uncle        Social History:   Social History     Socioeconomic History    Marital status: Single   Tobacco Use    Smoking status: Every Day     Current packs/day: 2.00     Average packs/day: 2.0 packs/day for 4.0 years (8.0 ttl pk-yrs)     Types: Cigarettes     Start date: 6/16/2021    Smokeless tobacco: Never   Vaping Use    Vaping status: Never Used   Substance and Sexual Activity    Alcohol use: Not Currently     Comment: I drink once every 4 months    Drug use: Never    Sexual activity: Yes     Partners: Female, Male     Birth control/protection: Condom, Partner of same sex       Alcohol/Tobacco History:   Social History     Substance and Sexual Activity   Alcohol Use Not Currently    Comment: I drink once every 4 months     Social History     Tobacco Use   Smoking Status Every Day    Current packs/day: 2.00    Average packs/day: 2.0 packs/day for 4.0 years (8.0 ttl pk-yrs)    Types: Cigarettes    Start date: 6/16/2021   Smokeless Tobacco Never       Medications:     Current Outpatient Medications:     valACYclovir (Valtrex) 1000 MG tablet, Take 1 tablet by mouth 2 (Two) Times a Day As Needed (outbreak)., Disp: 30 tablet, Rfl: 0    Allergies:   Allergies   Allergen Reactions    Cephalexin Rash and Hives      "cephelaxen - unspecified    Cephalosporins Other (See Comments)     As child.       Objective     Physical Exam:  Vital Signs:   Vitals:    06/12/25 0827   Weight: 58.5 kg (129 lb)   Height: 162.6 cm (64.02\")     Body mass index is 22.13 kg/m².     Physical Exam  Vitals and nursing note reviewed.   Constitutional:       Appearance: Normal appearance. Oleksandr is normal weight. Oleksandr is not ill-appearing or diaphoretic.      Comments: BMI 22.13. Pleasantly conversant.    HENT:      Head: Normocephalic and atraumatic.      Right Ear: External ear normal.      Left Ear: External ear normal.      Nose: Nose normal.      Mouth/Throat:      Mouth: Mucous membranes are moist.      Pharynx: Oropharynx is clear.   Eyes:      Conjunctiva/sclera: Conjunctivae normal.      Pupils: Pupils are equal, round, and reactive to light.   Neck:      Thyroid: No thyromegaly.   Cardiovascular:      Rate and Rhythm: Normal rate and regular rhythm.      Pulses: Normal pulses.      Heart sounds: Normal heart sounds.   Pulmonary:      Effort: Pulmonary effort is normal.      Breath sounds: Normal breath sounds.   Abdominal:      General: Abdomen is flat. Bowel sounds are normal. There is no distension.      Tenderness: There is no abdominal tenderness.   Musculoskeletal:         General: Normal range of motion.      Cervical back: Normal range of motion and neck supple.   Skin:     General: Skin is warm and dry.   Neurological:      General: No focal deficit present.      Mental Status: Oleksandr is oriented to person, place, and time.   Psychiatric:         Mood and Affect: Mood normal.         Assessment / Plan      Assessment/Plan:   There are no diagnoses linked to this encounter.     GERD  Fatty liver   - continue pantoprazole 40mg daily, refills sent to pharmacy    - pt given GERD diet instructions, advised to avoid GI irritants such as caffeine, carbonation, EtOH, tobacco, chocolate, peppermint, acid-based foods   - previous office notes, hospital " records, labs, imaging reports reviewed with patient    - obtain CBC, CMP, celiac panel, LIV[place] Fibrosure, LANCE, anti smooth muscle Ab, mitochondrial Ab, alpha 1 antitrypsin phenotype, ceruloplasmin, SPEP, IgG/IgM/IgA, Hep A/B serologies, iron profile, ferritin, PT/INR   - follow up in clinic after completion of above studies   - call clinic at any time for questions or new / worsened sx    Follow Up:   Return for Next scheduled follow up.    Plan of care reviewed with the patient at the conclusion of today's visit.  Education was provided regarding diagnosis, management, and any prescribed or recommended OTC medications.  Patient verbalized understanding of and agreement with management plan.     NOTE TO PATIENT: The 21st Century Cures Act makes medical notes like these available to patients in the interest of transparency. However, be advised this is a medical document. It is intended as peer to peer communication. It is written in medical language and may contain abbreviations or verbiage that are unfamiliar. It may appear blunt or direct. Medical documents are intended to carry relevant information, facts as evident, and the clinical opinion of the practitioner.     Time Statement:   Discussed plan of care in detail with patient today. Patient verbally understands and agrees. I have spent 45 minutes reviewing available diagnostics, obtaining history, examining the patient, developing a treatment plan, and educating the patient on disease process and plan of care.    Glenna Jimenez PA-C   [facility]

## 2025-06-13 LAB
ALBUMIN SERPL ELPH-MCNC: 4.1 G/DL (ref 2.9–4.4)
ALBUMIN/GLOB SERPL: 1.2 {RATIO} (ref 0.7–1.7)
ALPHA1 GLOB SERPL ELPH-MCNC: 0.2 G/DL (ref 0–0.4)
ALPHA2 GLOB SERPL ELPH-MCNC: 0.7 G/DL (ref 0.4–1)
ANA SER QL: NEGATIVE
B-GLOBULIN SERPL ELPH-MCNC: 1 G/DL (ref 0.7–1.3)
GAMMA GLOB SERPL ELPH-MCNC: 1.4 G/DL (ref 0.4–1.8)
GLOBULIN SER CALC-MCNC: 3.3 G/DL (ref 2.2–3.9)
HAV AB SER QL IA: POSITIVE
LABORATORY COMMENT REPORT: NORMAL
M PROTEIN SERPL ELPH-MCNC: NORMAL G/DL
MITOCHONDRIA M2 IGG SER-ACNC: <20 UNITS (ref 0–20)
PROT SERPL-MCNC: 7.4 G/DL (ref 6–8.5)
SMA IGG SER-ACNC: 8 UNITS (ref 0–19)

## 2025-06-16 LAB
ENDOMYSIUM IGA SER QL: NEGATIVE
IGA SERPL-MCNC: 183 MG/DL (ref 87–352)
TTG IGA SER-ACNC: <2 U/ML (ref 0–3)

## 2025-06-17 LAB
A1AT PHENOTYP SERPL IFE: NORMAL
A1AT SERPL-MCNC: 145 MG/DL (ref 100–188)
A2 MACROGLOB SERPL-MCNC: 292 MG/DL (ref 110–276)
ALT SERPL W P-5'-P-CCNC: 20 IU/L (ref 0–40)
APO A-I SERPL-MCNC: 168 MG/DL (ref 116–209)
AST SERPL W P-5'-P-CCNC: 26 IU/L (ref 0–40)
BILIRUB SERPL-MCNC: 0.5 MG/DL (ref 0–1.2)
CHOLEST SERPL-MCNC: 180 MG/DL (ref 100–199)
FIBROSIS SCORING:: ABNORMAL
FIBROSIS STAGE SERPL QL: ABNORMAL
GGT SERPL-CCNC: 9 IU/L (ref 0–60)
GLUCOSE SERPL-MCNC: 86 MG/DL (ref 70–99)
HAPTOGLOB SERPL-MCNC: 84 MG/DL (ref 33–278)
LABORATORY COMMENT REPORT: ABNORMAL
LIVER FIBR SCORE SERPL CALC.FIBROSURE: 0.11 (ref 0–0.21)
LIVER STEATOSIS GRADE SERPL QL: ABNORMAL
LIVER STEATOSIS SCORE SERPL: 0.07 (ref 0–0.4)
NASH GRADE SERPL QL: ABNORMAL
NASH INTERPRETATION SERPL-IMP: ABNORMAL
NASH SCORE SERPL: 0 (ref 0–0.25)
NASH SCORING: ABNORMAL
STEATOSIS SCORING: ABNORMAL
TEST PERFORMANCE INFO SPEC: ABNORMAL
TEST PERFORMANCE INFO SPEC: ABNORMAL
TRIGL SERPL-MCNC: 83 MG/DL (ref 0–149)

## 2025-06-18 ENCOUNTER — PATIENT ROUNDING (BHMG ONLY) (OUTPATIENT)
Dept: GASTROENTEROLOGY | Facility: CLINIC | Age: 25
End: 2025-06-18
Payer: OTHER GOVERNMENT

## 2025-06-18 NOTE — PROGRESS NOTES
[employer] MESSAGE HAS BEEN SENT TO THE PATIENT FOR PATIENT ROUNDING WITH [facility]

## 2025-06-20 ENCOUNTER — RESULTS FOLLOW-UP (OUTPATIENT)
Dept: GASTROENTEROLOGY | Facility: CLINIC | Age: 25
End: 2025-06-20
Payer: OTHER GOVERNMENT

## 2025-06-20 NOTE — PROGRESS NOTES
Please let Oleksandr know that their labs reveal the following:  - [place] Fibrosure reveals F0 (none) fibrosis, S0 (none) steatosis  - hepatitis panel was negative  - iron indices are slightly low; I recommend starting daily MV if not already taking such   - autoimmune liver disease workup was negative  - CMP reveals normal liver enzymes  - immunity to Hep A/B from previous vaccination  - celiac panel negative    Recommend office follow up in 6-12 months if needed.

## 2025-06-20 NOTE — LETTER
Jayla Lu  [address]   [address]  [facility] [address]    June 20, 2025     Dear   Aly:    Below are the results from your recent visit:    Resulted Orders   Comprehensive Metabolic Panel   Result Value Ref Range    Glucose 87 65 - 99 mg/dL    BUN 11.0 6.0 - 20.0 mg/dL    Creatinine 0.77 0.57 - 1.00 mg/dL    Sodium 141 136 - 145 mmol/L    Potassium 4.1 3.5 - 5.2 mmol/L    Chloride 106 98 - 107 mmol/L    CO2 20.0 (L) 22.0 - 29.0 mmol/L    Calcium 9.4 8.6 - 10.5 mg/dL    Total Protein 8.0 6.0 - 8.5 g/dL    Albumin 4.8 3.5 - 5.2 g/dL    ALT (SGPT) 17 1 - 33 U/L    AST (SGOT) 26 1 - 32 U/L    Alkaline Phosphatase 87 39 - 117 U/L    Total Bilirubin 0.7 0.0 - 1.2 mg/dL    Globulin 3.2 gm/dL    A/G Ratio 1.5 g/dL    BUN/Creatinine Ratio 14.3 7.0 - 25.0    Anion Gap 15.0 5.0 - 15.0 mmol/L    eGFR 110.6 >60.0 mL/min/1.73   Celiac Disease Panel   Result Value Ref Range    Endomysial IgA Negative Negative    Tissue Transglutaminase IgA <2 0 - 3 U/mL      Comment:                                    Negative        0 -  3                                Weak Positive   4 - 10                                Positive           >10   Tissue Transglutaminase (tTG) has been identified   as the endomysial antigen.  Studies have demonstr-   ated that endomysial IgA antibodies have over 99%   specificity for gluten sensitive enteropathy.    IgA 183 87 - 352 mg/dL   Hepatitis A Antibody, Total   Result Value Ref Range    Hep A Total Ab Positive (A) Negative      Comment:      Comment: The HAV total antibody assay detects both IgG and IgM  but does not differentiate between them. A negative result  suggests susceptibility to infection. A positive result could  be due to vaccination, previously resolved infection or active  infection. Testing for HAV IgM should be performed if active  HAV infection is suspected. [facility] offers profiles that will  automatically reflex positive HAV total antibody results to  IgM (e.g., panel #[id number]  HAV Antibody w/ Rfx).   Hepatitis B Surface Antibody   Result Value Ref Range    Hep B S Ab Reactive    LANCE   Result Value Ref Range    LANCE Direct Negative Negative   Anti-Smooth Muscle Antibody Titer   Result Value Ref Range    Smooth Muscle Ab 8 0 - 19 Units      Comment:                       Negative                     0 - 19                   Weak positive               20 - 30                   Moderate to strong positive     >30   Actin Antibodies are found in 52-85% of patients with   autoimmune hepatitis or chronic active hepatitis and   in 22% of patients with primary biliary cirrhosis.   Mitochondrial Antibodies, M2   Result Value Ref Range    Mitochondrial Ab <20.0 0.0 - 20.0 Units      Comment:                                      Negative    0.0 - 20.0                                  Equivocal  20.1 - 24.9                                  Positive         >24.9  Mitochondrial (M2) Antibodies are found in 90-96% of  patients with primary biliary cirrhosis.   Hepatitis Panel, Acute   Result Value Ref Range    Hepatitis B Surface Ag Non-Reactive Non-Reactive    Hep A IgM Non-Reactive Non-Reactive    Hep B C IgM Non-Reactive Non-Reactive    Hepatitis C Ab Non-Reactive Non-Reactive   L[place] Fibrosure Plus   Result Value Ref Range    Fibrosis Score 0.11 0.00 - 0.21    Fibrosis Stage Comment       Comment:                         F0 - No fibrosis    Steatosis Score (Reference) 0.07 0.00 - 0.40    Steatosis Grade (Reference) Comment       Comment:                         S0 - No Steatosis (<5%)    LIV[place] Score (Reference) 0.00 0.00 - 0.25    Living[place] Grade (Reference) Comment       Comment:                         N0 - No LIVING[place]ON    Methodology: Comment       Comment:      The analytes tested are performed by FibroSure-Specific methods.  Not intended for use with other diagnostic considerations.    Alpha 2-Macroglobulins, Qn 292 (H) 110 - 276 mg/dL    Haptoglobin 84 33 - 278 mg/dL    Apolipoprotein A-1 168 116 - 209  mg/dL    Total Bilirubin 0.5 0.0 - 1.2 mg/dL    GGT 9 0 - 60 IU/L    ALT (SGPT) 20 0 - 40 IU/L    AST (SGOT) P5P (Reference) 26 0 - 40 IU/L    Cholesterol, Total (Reference) 180 100 - 199 mg/dL    Glucose, Serum (Reference) 86 70 - 99 mg/dL    Triglycerides 83 0 - 149 mg/dL    Interpretations: (Reference) Comment       Comment:      Quantitative results of 10 biochemicals in combination with age and  gender, are analyzed using a computational algorithm to provide a  quantitative surrogate marker (0.0-1.0) of liver fibrosis (Metavir  F0-F4), hepatic steatosis (0.0-1.0, S0-S3), and Non-Alcoholic  Steato-Hepatitis (LIVINGSTON) (0.0-1.0, N0-N3), now known as Metabolic  Dysfunction-Associated Steatohepatitis (MASH). The absence of  steatosis (S<0.40) precludes the diagnosis of LIVINGSTON/MASH.  Fibrosis marker: In a study of 171 Non-Alcoholic Fatty Liver Disease  (NAFLD), now known as Metabolic Dysfunction-Associated Steatotic  Liver Disease (MASLD), patients where 23% had significant NAFLD/MASLD  fibrosis (Metavir F2-F4) and 11% had cirrhosis by liver biopsy, a  fibrosis result of >0.3 yielded a sensitivity of 83% and a  specificity of 78% for the detection of significant fibrosis.[1]  Steatosis marker: In a population of 2997 patients, where 61% had  significant steatosis (>=5%) on a liver biopsy, a steatosis score  >0.4 had a  sensitivity of 79% and a specificity of 50% for  identification of significant steatosis.[2]  LIVINGST[place]/MASH marker: In a population of 1081 NAFLD/MASLD patients, where  51% had at least some LIVING[place]ON/MASH by liver biopsy, a prediction of  LIVINGSTON/MASH had a sensitivity of 72% for identifying LIVINGSTON/MASH and a  specificity of 71%.[3]    Fibrosis Scoring: Comment       Comment:           <=0.21 = Stage F0 - No fibrosis  0.21 - 0.27 = Stage F0 - F1  0.27 - 0.31 = Stage F1 - Portal fibrosis  0.31 - 0.48 = Stage F1 - F2  0.48 - 0.58 = Stage F2 - Bridging fibrosis with few septa  0.58 - 0.72 = Stage F3 - Bridging fibrosis  with many septa  0.72 - 0.74 = Stage F3 - F4        >0.74 = Stage F4 - Cirrhosis    Steatosis Scoring Comment       Comment:           <=0.40 = S0  - No Steatosis (<5%)  0.40 - 0.55 = S1  - Mild Steatosis                      (but Clinically Significant) (5-33%)        >0.55 = S2S3- Moderate to Severe Steatosis                      (Clinically Significant) (%)    LIVING[place]ON Scoring Comment       Comment:           <=0.25 = N0 - No LIV[place]/MASH  0.25 - 0.50 = N1 - Mild LIV[place]/MASH  0.50 - 0.75 = N2 - Moderate LIV[place]/MASH        >0.75 = N3 - Severe LIV[place]/MASH    Limitations: Comment       Comment:      LIVING[place] FibroSure(R) Plus is recommended for patients with suspected  non-alcoholic fatty liver disease, now known as Metabolic  Dysfunction-Associated Steatotic Liver Disease or MASLD.  It is not recommended for patients with other liver diseases.  It is also not recommended in patients with Gilbert Disease,  acute hemolysis, acute viral hepatitis, drug induced hepatitis,  genetic liver disease, autoimmune hepatitis and/or extra-hepatic  cholestasis. Any of these clinical situations may lead to  inaccurate quantitative predictions of fibrosis.    Comment Comment       Comment:      This test was developed and its performance characteristics  determined by [employer]. It has not been cleared or approved  by the Food and Drug Administration.  For questions regarding this report please contact customer service  at [phone].  References:  1.  Kasi CONLEY et al. Diagnostic Value of Biochemical Markers  (FibroTest) for the prediction of Liver Fibrosis in patients with  Non-Alcoholic Fatty Liver Disease. BMC Gastroenterology 2006; 6:6.  2.  Jaylin OLEARY. et al. The Diagnostic Performance of a Simplified  Blood Test (SteatoTest-2) for the Prediction of Liver Steatosis.  Eur J Gastroenterol Hepatol. 2019; 31:393-402.  3.  Jaylin OLEARY. et al. Diagnostic performance of a new noninvasive  test for nonalcoholic steatohepatitis using a simplified  "histological  reference. Eur J Gastroenterol Hepatol. 2018 May; 30:569-577.   Protein Electrophoresis, Total   Result Value Ref Range    Total Protein 7.4 6.0 - 8.5 g/dL    Albumin 4.1 2.9 - 4.4 g/dL    Alpha-1-Globulin 0.2 0.0 - 0.4 g/dL    Alpha-2-Globulin 0.7 0.4 - 1.0 g/dL    Beta Globulin 1.0 0.7 - 1.3 g/dL    Gamma Globulin 1.4 0.4 - 1.8 g/dL    M-Beto Not Observed Not Observed g/dL    Globulin 3.3 2.2 - 3.9 g/dL    A/G Ratio 1.2 0.7 - 1.7    Please note Comment       Comment:      Protein electrophoresis scan will follow via computer, mail, or   delivery.   IgG, IgA, IgM   Result Value Ref Range    IgG 1,389 700 - 1,600 mg/dL    IgM 207 40 - 230 mg/dL    IgA 199 70 - 400 mg/dL   Alpha - 1 - Antitrypsin Phenotype   Result Value Ref Range    A-1 Antitrypsin 145 100 - 188 mg/dL    Phenotype MM       Comment:      \"MM\" Phenotype is considered to be \"normal\", producing  normal serum levels of alpha-1-protease inhibitor and  not associated with clinical disease. Associated A1A  total serum levels in other phenotypes and their  incidence in the general population are shown in the  table below.  Phenotype  Population    % function      A-1-AT Conc.*             Incidence %  compared to MM  (Typical Range)     MM        86.5%           100%         (96 - 189)     MS         8.0%            86%         (83 - 161)     MZ         3.9%            61%         (60 - 111)     FM         0.4%           100%         (93 - 191)     SZ         0.3%            41%         (42 -  75)     SS         0.1%            64%         (62 - 119)     ZZ         0.05%           19%         (16 -  38)     FS         0.05%           70%         (70 - 128)     FZ        Unknown          46%         (44 -  88)     FF        Unknown                        Unknown  *A-1-AT concentration in the homozygous MM phenotype   is taken  as the reference normal. Percent deficiency   in each phenotype is reported relative to this   reference. Ranges " used to confirm phenotype.   Iron Profile w/o Ferritin   Result Value Ref Range    Iron 33 (L) 37 - 145 mcg/dL    Iron Saturation (TSAT) 6 (L) 20 - 50 %    Transferrin 342 200 - 360 mg/dL    TIBC 510 298 - 536 mcg/dL   Ferritin   Result Value Ref Range    Ferritin 12.20 (L) 13.00 - 150.00 ng/mL   Protime-INR   Result Value Ref Range    Protime 13.2 12.2 - 15.3 Seconds    INR 0.95 0.89 - 1.12        labs reveal the following:  - [place] Fibrosure reveals F0 (none) fibrosis, S0 (none) steatosis  - hepatitis panel was negative  - iron indices are slightly low; I recommend starting daily MV if not already taking such   - autoimmune liver disease workup was negative  - CMP reveals normal liver enzymes  - immunity to Hep A/B from previous vaccination  - celiac panel negative     Recommend office follow up in 6-12 months if needed.          Alpha - 1 - Antitrypsin Phenotype; LIV[place] Fibrosure Plus; Celiac Disease Panel; Protein Electrophoresis, Total; Mitochondrial Antibodies, M2 (10 more orders)    If you have any questions or concerns, please don't hesitate to call.         Sincerely,        Glenna Jimenez PA-C

## 2025-07-31 DIAGNOSIS — B00.9 HSV-2 (HERPES SIMPLEX VIRUS 2) INFECTION: ICD-10-CM

## 2025-07-31 RX ORDER — VALACYCLOVIR HYDROCHLORIDE 1 G/1
1000 TABLET, FILM COATED ORAL 2 TIMES DAILY PRN
Qty: 30 TABLET | Refills: 0 | Status: SHIPPED | OUTPATIENT
Start: 2025-07-31

## 2025-08-08 ENCOUNTER — TELEMEDICINE (OUTPATIENT)
Dept: FAMILY MEDICINE CLINIC | Facility: TELEHEALTH | Age: 25
End: 2025-08-08

## 2025-08-08 DIAGNOSIS — H10.32 ACUTE BACTERIAL CONJUNCTIVITIS OF LEFT EYE: Primary | ICD-10-CM

## 2025-08-08 RX ORDER — OFLOXACIN 3 MG/ML
SOLUTION/ DROPS OPHTHALMIC
Qty: 10 ML | Refills: 0 | Status: SHIPPED | OUTPATIENT
Start: 2025-08-08